# Patient Record
Sex: MALE | Race: WHITE | NOT HISPANIC OR LATINO | URBAN - METROPOLITAN AREA
[De-identification: names, ages, dates, MRNs, and addresses within clinical notes are randomized per-mention and may not be internally consistent; named-entity substitution may affect disease eponyms.]

---

## 2017-07-25 ENCOUNTER — INPATIENT (INPATIENT)
Facility: HOSPITAL | Age: 82
LOS: 6 days | Discharge: NURSING, REHAB OR LONG-TERM CARE | End: 2017-08-01
Attending: HOSPITALIST | Admitting: HOSPITALIST
Payer: MEDICARE

## 2017-07-25 VITALS
TEMPERATURE: 98 F | SYSTOLIC BLOOD PRESSURE: 150 MMHG | HEART RATE: 95 BPM | RESPIRATION RATE: 16 BRPM | DIASTOLIC BLOOD PRESSURE: 80 MMHG | OXYGEN SATURATION: 99 %

## 2017-07-25 DIAGNOSIS — E78.5 HYPERLIPIDEMIA, UNSPECIFIED: ICD-10-CM

## 2017-07-25 DIAGNOSIS — I10 ESSENTIAL (PRIMARY) HYPERTENSION: ICD-10-CM

## 2017-07-25 DIAGNOSIS — R56.9 UNSPECIFIED CONVULSIONS: ICD-10-CM

## 2017-07-25 DIAGNOSIS — I25.10 ATHEROSCLEROTIC HEART DISEASE OF NATIVE CORONARY ARTERY WITHOUT ANGINA PECTORIS: ICD-10-CM

## 2017-07-25 DIAGNOSIS — Z95.1 PRESENCE OF AORTOCORONARY BYPASS GRAFT: Chronic | ICD-10-CM

## 2017-07-25 DIAGNOSIS — S06.360A TRAUMATIC HEMORRHAGE OF CEREBRUM, UNSPECIFIED, WITHOUT LOSS OF CONSCIOUSNESS, INITIAL ENCOUNTER: ICD-10-CM

## 2017-07-25 DIAGNOSIS — I62.9 NONTRAUMATIC INTRACRANIAL HEMORRHAGE, UNSPECIFIED: ICD-10-CM

## 2017-07-25 DIAGNOSIS — I61.9 NONTRAUMATIC INTRACEREBRAL HEMORRHAGE, UNSPECIFIED: ICD-10-CM

## 2017-07-25 DIAGNOSIS — Z29.9 ENCOUNTER FOR PROPHYLACTIC MEASURES, UNSPECIFIED: ICD-10-CM

## 2017-07-25 LAB
ALBUMIN SERPL ELPH-MCNC: 3.6 G/DL — SIGNIFICANT CHANGE UP (ref 3.3–5)
ALP SERPL-CCNC: 92 U/L — SIGNIFICANT CHANGE UP (ref 40–120)
ALT FLD-CCNC: 13 U/L — SIGNIFICANT CHANGE UP (ref 4–41)
AST SERPL-CCNC: 17 U/L — SIGNIFICANT CHANGE UP (ref 4–40)
BASE EXCESS BLDV CALC-SCNC: -3.5 MMOL/L — SIGNIFICANT CHANGE UP
BASE EXCESS BLDV CALC-SCNC: -3.7 MMOL/L — SIGNIFICANT CHANGE UP
BASOPHILS # BLD AUTO: 0.02 K/UL — SIGNIFICANT CHANGE UP (ref 0–0.2)
BASOPHILS NFR BLD AUTO: 0.3 % — SIGNIFICANT CHANGE UP (ref 0–2)
BILIRUB SERPL-MCNC: 0.8 MG/DL — SIGNIFICANT CHANGE UP (ref 0.2–1.2)
BLOOD GAS VENOUS - CREATININE: 2.49 MG/DL — HIGH (ref 0.5–1.3)
BLOOD GAS VENOUS - CREATININE: 2.6 MG/DL — HIGH (ref 0.5–1.3)
BUN SERPL-MCNC: 43 MG/DL — HIGH (ref 7–23)
CALCIUM SERPL-MCNC: 8.7 MG/DL — SIGNIFICANT CHANGE UP (ref 8.4–10.5)
CHLORIDE BLDV-SCNC: 113 MMOL/L — HIGH (ref 96–108)
CHLORIDE BLDV-SCNC: 114 MMOL/L — HIGH (ref 96–108)
CHLORIDE SERPL-SCNC: 107 MMOL/L — SIGNIFICANT CHANGE UP (ref 98–107)
CO2 SERPL-SCNC: 20 MMOL/L — LOW (ref 22–31)
CREAT SERPL-MCNC: 2.55 MG/DL — HIGH (ref 0.5–1.3)
EOSINOPHIL # BLD AUTO: 0.05 K/UL — SIGNIFICANT CHANGE UP (ref 0–0.5)
EOSINOPHIL NFR BLD AUTO: 0.6 % — SIGNIFICANT CHANGE UP (ref 0–6)
GAS PNL BLDV: 140 MMOL/L — SIGNIFICANT CHANGE UP (ref 136–146)
GAS PNL BLDV: 142 MMOL/L — SIGNIFICANT CHANGE UP (ref 136–146)
GLUCOSE BLDV-MCNC: 120 — HIGH (ref 70–99)
GLUCOSE BLDV-MCNC: 138 — HIGH (ref 70–99)
GLUCOSE SERPL-MCNC: 139 MG/DL — HIGH (ref 70–99)
HCO3 BLDV-SCNC: 20 MMOL/L — SIGNIFICANT CHANGE UP (ref 20–27)
HCO3 BLDV-SCNC: 21 MMOL/L — SIGNIFICANT CHANGE UP (ref 20–27)
HCT VFR BLD CALC: 32.8 % — LOW (ref 39–50)
HCT VFR BLDV CALC: 33.1 % — LOW (ref 39–51)
HCT VFR BLDV CALC: 36.4 % — LOW (ref 39–51)
HGB BLD-MCNC: 11.7 G/DL — LOW (ref 13–17)
HGB BLDV-MCNC: 10.7 G/DL — LOW (ref 13–17)
HGB BLDV-MCNC: 11.8 G/DL — LOW (ref 13–17)
IMM GRANULOCYTES # BLD AUTO: 0.03 # — SIGNIFICANT CHANGE UP
IMM GRANULOCYTES NFR BLD AUTO: 0.4 % — SIGNIFICANT CHANGE UP (ref 0–1.5)
INR BLD: 1.15 — SIGNIFICANT CHANGE UP (ref 0.88–1.17)
LACTATE BLDV-MCNC: 0.8 MMOL/L — SIGNIFICANT CHANGE UP (ref 0.5–2)
LACTATE BLDV-MCNC: 2.1 MMOL/L — HIGH (ref 0.5–2)
LYMPHOCYTES # BLD AUTO: 0.84 K/UL — LOW (ref 1–3.3)
LYMPHOCYTES # BLD AUTO: 10.7 % — LOW (ref 13–44)
MCHC RBC-ENTMCNC: 33.4 PG — SIGNIFICANT CHANGE UP (ref 27–34)
MCHC RBC-ENTMCNC: 35.7 % — SIGNIFICANT CHANGE UP (ref 32–36)
MCV RBC AUTO: 93.7 FL — SIGNIFICANT CHANGE UP (ref 80–100)
MONOCYTES # BLD AUTO: 0.92 K/UL — HIGH (ref 0–0.9)
MONOCYTES NFR BLD AUTO: 11.7 % — SIGNIFICANT CHANGE UP (ref 2–14)
NEUTROPHILS # BLD AUTO: 6 K/UL — SIGNIFICANT CHANGE UP (ref 1.8–7.4)
NEUTROPHILS NFR BLD AUTO: 76.3 % — SIGNIFICANT CHANGE UP (ref 43–77)
NRBC # FLD: 0 — SIGNIFICANT CHANGE UP
PCO2 BLDV: 32 MMHG — LOW (ref 41–51)
PCO2 BLDV: 35 MMHG — LOW (ref 41–51)
PH BLDV: 7.39 PH — SIGNIFICANT CHANGE UP (ref 7.32–7.43)
PH BLDV: 7.42 PH — SIGNIFICANT CHANGE UP (ref 7.32–7.43)
PLATELET # BLD AUTO: 128 K/UL — LOW (ref 150–400)
PMV BLD: 9.5 FL — SIGNIFICANT CHANGE UP (ref 7–13)
PO2 BLDV: 32 MMHG — LOW (ref 35–40)
PO2 BLDV: < 24 MMHG — LOW (ref 35–40)
POTASSIUM BLDV-SCNC: 3.8 MMOL/L — SIGNIFICANT CHANGE UP (ref 3.4–4.5)
POTASSIUM BLDV-SCNC: 4.2 MMOL/L — SIGNIFICANT CHANGE UP (ref 3.4–4.5)
POTASSIUM SERPL-MCNC: 4.6 MMOL/L — SIGNIFICANT CHANGE UP (ref 3.5–5.3)
POTASSIUM SERPL-SCNC: 4.6 MMOL/L — SIGNIFICANT CHANGE UP (ref 3.5–5.3)
PROT SERPL-MCNC: 6.5 G/DL — SIGNIFICANT CHANGE UP (ref 6–8.3)
PROTHROM AB SERPL-ACNC: 12.9 SEC — SIGNIFICANT CHANGE UP (ref 9.8–13.1)
RBC # BLD: 3.5 M/UL — LOW (ref 4.2–5.8)
RBC # FLD: 13.4 % — SIGNIFICANT CHANGE UP (ref 10.3–14.5)
RH IG SCN BLD-IMP: POSITIVE — SIGNIFICANT CHANGE UP
SAO2 % BLDV: 21.9 % — LOW (ref 60–85)
SAO2 % BLDV: 49.8 % — LOW (ref 60–85)
SODIUM SERPL-SCNC: 143 MMOL/L — SIGNIFICANT CHANGE UP (ref 135–145)
WBC # BLD: 7.86 K/UL — SIGNIFICANT CHANGE UP (ref 3.8–10.5)
WBC # FLD AUTO: 7.86 K/UL — SIGNIFICANT CHANGE UP (ref 3.8–10.5)

## 2017-07-25 PROCEDURE — 70450 CT HEAD/BRAIN W/O DYE: CPT | Mod: 26

## 2017-07-25 PROCEDURE — 99291 CRITICAL CARE FIRST HOUR: CPT

## 2017-07-25 RX ORDER — ATORVASTATIN CALCIUM 80 MG/1
1 TABLET, FILM COATED ORAL
Qty: 0 | Refills: 0 | COMMUNITY

## 2017-07-25 RX ORDER — LEVETIRACETAM 250 MG/1
1000 TABLET, FILM COATED ORAL ONCE
Qty: 0 | Refills: 0 | Status: COMPLETED | OUTPATIENT
Start: 2017-07-25 | End: 2017-07-25

## 2017-07-25 RX ORDER — OXYBUTYNIN CHLORIDE 5 MG
2.5 TABLET ORAL
Qty: 0 | Refills: 0 | COMMUNITY

## 2017-07-25 RX ORDER — LABETALOL HCL 100 MG
10 TABLET ORAL ONCE
Qty: 0 | Refills: 0 | Status: DISCONTINUED | OUTPATIENT
Start: 2017-07-25 | End: 2017-07-25

## 2017-07-25 RX ORDER — DESMOPRESSIN ACETATE 0.1 MG/1
29 TABLET ORAL ONCE
Qty: 0 | Refills: 0 | Status: COMPLETED | OUTPATIENT
Start: 2017-07-25 | End: 2017-07-25

## 2017-07-25 RX ORDER — PANTOPRAZOLE SODIUM 20 MG/1
1 TABLET, DELAYED RELEASE ORAL
Qty: 0 | Refills: 0 | COMMUNITY

## 2017-07-25 RX ADMIN — LEVETIRACETAM 400 MILLIGRAM(S): 250 TABLET, FILM COATED ORAL at 16:27

## 2017-07-25 RX ADMIN — DESMOPRESSIN ACETATE 229 MICROGRAM(S): 0.1 TABLET ORAL at 16:45

## 2017-07-25 RX ADMIN — LEVETIRACETAM 400 MILLIGRAM(S): 250 TABLET, FILM COATED ORAL at 16:09

## 2017-07-25 RX ADMIN — Medication 1 MILLIGRAM(S): at 16:05

## 2017-07-25 NOTE — ED ADULT NURSE NOTE - OBJECTIVE STATEMENT
Facilitator note: Pt received to room 4 as a stroke code pt noted with left side weakness he is able to follow commands and answer questions pt during sandor evaluation note to gaze off and have left arm tremor neruo resident witness partial seizure he was medicated as ordered. Pt was placed on a cardiac monitor an iv was accessed labs sent. See systems for assessment.

## 2017-07-25 NOTE — H&P ADULT - PROBLEM SELECTOR PLAN 6
SCDs, chemical prophylaxis contraindicated due to ICH Will restart atorvastatin 40 mg once taking po

## 2017-07-25 NOTE — ED PROVIDER NOTE - MEDICAL DECISION MAKING DETAILS
Witnessed seizure activity in ED. Frontal bleed on CT. PT/INR, CMP, CBC, type and cross, platelets ordered. Seen by neurology and neurosurgery. Will admit to ICU. Witnessed seizure activity in ED. Frontal bleed on CT. PT/INR, CMP, CBC, type and cross, platelets ordered. Seen by neurology and neurosurgery. Seizure ppx, blood pressure control goal < 160. Will admit to ICU.

## 2017-07-25 NOTE — ED PROVIDER NOTE - PHYSICAL EXAMINATION
General: well appearing male in mild distress    HEENT: normocephalic, atraumatic   Respiratory: lungs clear to auscultation bilaterally   Cardiac: regular rate and rhythm, vertical scar on chest   Abdomen: soft, non-tender  MSK:   Neuro: cranial nerves II-XII intact, slight left sided naso-labial flattening, left sided weakness in upper and lower extremities, no sensory deficits General: well appearing male in mild distress    HEENT: normocephalic, atraumatic   Respiratory: lungs clear to auscultation bilaterally   Cardiac: regular rate and rhythm, vertical scar on chest   Abdomen: soft, non-tender  MSK: abrasion on right arm  Neuro: A&Ox3, cranial nerves II-XII intact, slight left sided naso-labial flattening, left sided weakness in upper and lower extremities, observed partial left-sided seizure activity, no sensory deficits

## 2017-07-25 NOTE — CONSULT NOTE ADULT - SUBJECTIVE AND OBJECTIVE BOX
Neurology Consult    Name  LAYTON BEAULIEU    89 year old gentleman who presents from doctor's office where he had a seizure of his left upper extremity. This was followed by inability to move his his left arm since the event. Patient was visiting his neurologist at the time reviewing the results of an MRI and MRA.   Currently patient is still having weakness on his left side and is also complaining of a headache.  Code stroke was called and it was discovered that he had a right frontal hemorrhage and deemed not a tPA candidate. He then went on to develop another focal seizure of the left side of his body which lasted less than 30 seconds without post-ictal period.   MRS 1                                                          MEDICATIONS  (STANDING):  levETIRAcetam  IVPB 1000 milliGRAM(s) IV Intermittent once  desmopressin IVPB 29 MICROGram(s) IV Intermittent Once  levETIRAcetam  IVPB 1000 milliGRAM(s) IV Intermittent once  LORazepam   Injectable 1 milliGRAM(s) IV Push Once    MEDICATIONS  (PRN):      Allergies    No Known Allergies    Intolerances        Objective  Vital Signs Last 24 Hrs  T(C): 36.7 (25 Jul 2017 15:25), Max: 36.7 (25 Jul 2017 15:25)  T(F): 98 (25 Jul 2017 15:25), Max: 98 (25 Jul 2017 15:25)  HR: 95 (25 Jul 2017 15:25) (95 - 95)  BP: 150/80 (25 Jul 2017 15:25) (150/80 - 150/80)  BP(mean): --  RR: 16 (25 Jul 2017 15:25) (16 - 16)  SpO2: 99% (25 Jul 2017 15:25) (99% - 99%)    General Exam   General appearance: No acute distress, well-nourished  Respiratory:    non-labored respirations               Neurological Exam  Mental Status:  alert and oriented x3, fluent speech, following commands    Cranial Nerves: left pupil 2 mm, right pupil 2.5mm, EOMI without nystagmus, visual fields intact, left facial droop, no dysarthria    Motor:   Tone:   normal               Strength:  Upper extremity                          Delt       Bicep    Tricep                                                  R         5/5        5/5        5/5       5/5                                               L          4/5        3/5        3/5      2/5    Lower extremity: both lower extremities drift                          Pronator drift:   none           Dysmetria: none with finger-to-nose testing  Tremor:  none appreciated at rest or in action    Sensation: intact grossly to light touch    Deep Tendon Reflexes:   Toes flexor bilaterally ________    Gait:     Other Studies                    Radiology    CTh/CTA:  MRI/MRA:  TTE:  EEG: Neurology Consult    Name  LAYTON BEAULIEU    89 year old gentleman who presents from doctor's office where he had a seizure of his left upper extremity. This was followed by inability to move his his left arm since the event. Patient was visiting his neurologist at the time reviewing the results of an MRI and MRA. Reported feeling dizzy and lightheaded earlier that day.  Currently patient is still having weakness on his left side and is also complaining of a headache.  Code stroke was called and it was discovered that he had a right frontal hemorrhage and deemed not a tPA candidate. He then went on to develop another focal seizure of the left side of his body which lasted less than 30 seconds without post-ictal period.   MRS 1                                                          MEDICATIONS  (STANDING):  levETIRAcetam  IVPB 1000 milliGRAM(s) IV Intermittent once  desmopressin IVPB 29 MICROGram(s) IV Intermittent Once  levETIRAcetam  IVPB 1000 milliGRAM(s) IV Intermittent once  LORazepam   Injectable 1 milliGRAM(s) IV Push Once    MEDICATIONS  (PRN):      Allergies    No Known Allergies    Intolerances        Objective  Vital Signs Last 24 Hrs  T(C): 36.7 (25 Jul 2017 15:25), Max: 36.7 (25 Jul 2017 15:25)  T(F): 98 (25 Jul 2017 15:25), Max: 98 (25 Jul 2017 15:25)  HR: 95 (25 Jul 2017 15:25) (95 - 95)  BP: 150/80 (25 Jul 2017 15:25) (150/80 - 150/80)  BP(mean): --  RR: 16 (25 Jul 2017 15:25) (16 - 16)  SpO2: 99% (25 Jul 2017 15:25) (99% - 99%)    General Exam   General appearance: No acute distress, well-nourished  Respiratory:    non-labored respirations               Neurological Exam  Mental Status:  alert and oriented x3, fluent speech, following commands    Cranial Nerves: left pupil 2 mm, right pupil 2.5mm, EOMI without nystagmus, visual fields intact, left facial droop, no dysarthria    Motor:   Tone:   normal               Strength:  Upper extremity                          Delt       Bicep    Tricep                                                  R         5/5        5/5        5/5       5/5                                               L          4/5        3/5        3/5      2/5    Lower extremity: both lower extremities drift                          Pronator drift:   left UE, LE       Dysmetria: none with finger-to-nose testing  Tremor:  none appreciated at rest or in action            Radiology    CTh: Acute right superior frontal gyrus hematoma, with associated vasogenic  edema. No midline shift. No hydrocephalus. While this may be due to  hemorrhagic infarction or hypertensive hemorrhage, neoplasm or vascular  malformation cannot be excluded. Recommend contrast brain MRI and or head  CTA for further evaluation.    Subarachnoid and interhemispheric/right tentorial subdural hemorrhage is  also seen.

## 2017-07-25 NOTE — ED PROVIDER NOTE - PROGRESS NOTE DETAILS
AJM STROKE CODE NOTE: Pt presenting from neurologists office with left sided weakness. Around 240 he had twitching in left arm which was noted as a possible "partial complex seizure" then weakness in left arm and leg. ams for several days after a fall. Here patient has slightly decreased strength in left arm and left leg. CN 2-12 intact. follows commands well. stroke code called. signed out to dr barrera. ct head ordered and neuro aware neurosurgery consulted to see pt in ED. Kevin Quiroz MD PGY3

## 2017-07-25 NOTE — ED ADULT NURSE REASSESSMENT NOTE - NS ED NURSE REASSESS COMMENT FT1
Patient changed, skin intact, areas of blanchable redness to buttocks/sacral area. Diaper rash noted to ileac folds.

## 2017-07-25 NOTE — ED ADULT NURSE REASSESSMENT NOTE - NS ED NURSE REASSESS COMMENT FT1
1710 Pt is resting well no seizure activity noted v/s taken patient is afebrile cm NSR Pt is receiving his first unit of Platelets via an alaris pump and #20 gauge in the right AC .will continue to monitor.

## 2017-07-25 NOTE — H&P ADULT - PROBLEM SELECTOR PLAN 4
Holding metoprolol, will check TTE in AM Currently at goal BP of 140/90  - Will start cardene drip if becomes hypertensive  - Holding metoprolol

## 2017-07-25 NOTE — H&P ADULT - NSHPPHYSICALEXAM_GEN_ALL_CORE
General: awake, not in distress  Neuro: AAOx1, CN II-XII intact, rare one word communication, following commands, increased tone in all extremities, pronator drift, LE strength 2/5 b/l, upper extremity strength 4/5 b/l, resting tremor, pronator drift.   HEENT: EOMI  Neck: no JVD or carotid bruit  Lymph: no cervical lymphadenopathy  Lungs: CTA b/l  Heart: RRR, no murmurs  Abdomen: soft, NTND, +BS  : madrigal in place  Extremities: no pedal edema  Psych: could not assess

## 2017-07-25 NOTE — H&P ADULT - ASSESSMENT
89M with HTN and CAD s/p CABG, recent ICU admission at [place] reportedly for respiratory failure requiring intubation and epistaxis requiring multiple transfusions presents with fall and subsequent seizures, code stroke in ED, found to have acute R frontal intraparenchymal hemorrhage. 89M with HTN and CAD s/p CABG presents with fall and subsequent seizures, code stroke in ED, found to have acute R frontal intraparenchymal hemorrhage.

## 2017-07-25 NOTE — ED ADULT NURSE REASSESSMENT NOTE - NS ED NURSE REASSESS COMMENT FT1
Report taken from daytime RN Judie- per daytime RN's report, pt was A&Ox3, however at this time, pt is A&Ox1 to person. L sided weakness noted with drift with minimal response to commands. MICU attending and resident are at bedside for consult- pt will be going to MICU pending bed assignment. Pt placed on supplemental O2 via NC, respirations even and unlabored. Will monitor patient closely.

## 2017-07-25 NOTE — ED ADULT TRIAGE NOTE - CHIEF COMPLAINT QUOTE
had focal seizure in neurologist office post seizure pt was unable to move left side since 1444, also AMS x few days and fall yesterday

## 2017-07-25 NOTE — H&P ADULT - PROBLEM SELECTOR PLAN 3
Currently at goal BP of 140/90  - Will start cardene drip if becomes hypertensive  - Holding metoprolol Unclear baseline - monitor creatinine, send urine electrolytes

## 2017-07-25 NOTE — ED ADULT NURSE REASSESSMENT NOTE - NS ED NURSE REASSESS COMMENT FT1
1745 first unit of platelets completed without any adverse reactions pt remains afebrile respirations are even unlabored cm NSR.

## 2017-07-25 NOTE — H&P ADULT - HISTORY OF PRESENT ILLNESS
89M with HTN and CAD s/p CABG, recent month-long ICU admission at [place] reportedly for respiratory failure requiring intubation x 1 month and epistaxis requiring 9u blood transfusions presents following fall at home and seizure at his neurologist's office. Per ED documentation, on arrival in the ED pt complained of headache and left sided weakness since his seizure but was conversant and AAOx3 at the time. Code stroke was called and pt was deemed to not be a TPA candidate. CT head revealed acute R superior frontal gyrus subarachnoid hematoma without midline shift along with subarachnoid and interhemispheric/right tentorial subdural hemorrhage. Pt was evaluated by neurology neurosurgery who recommended repeat CT head in the AM.     Pt is Currently lethargic and AAOx1, called wife for further history. Pt reportedly got out of bed to early this AM to bring her a glass of water but fell on the carpeted floor of her bedroom and was unable to get up. Pt's wife called EMS who offered to bring pt to the hospital but pt and wife refused, preferring instead to wait for his neurology appointment at 2pm. Pt's wife was somehow able to bring him to his neurology appointment, where he had the left-sided focal seizure and was sent to the ED.    Pt currently has normal vital signs. Labs revealed mild anemia and Cr 2.55. Pt was given 2g keppra, DDAVP, and platelets. 89M with HTN and CAD s/p CABG, recent month-long ICU admission at [facility], presents following fall at home and seizure at his neurologist's office. Per ED documentation, on arrival in the ED pt complained of headache and left sided weakness since his seizure but was conversant and AAOx3 at the time. Code stroke was called and pt was deemed to not be a TPA candidate. CT head revealed acute R superior frontal gyrus subarachnoid hematoma without midline shift along with subarachnoid and interhemispheric/right tentorial subdural hemorrhage. Pt was evaluated by neurology neurosurgery who recommended repeat CT head in the AM.     Pt is Currently lethargic and AAOx1, called wife for further history. Pt reportedly got out of bed to early this AM to bring her a glass of water but fell on the carpeted floor of her bedroom and was unable to get up. Pt's wife called EMS who offered to bring pt to the hospital but pt and wife refused, preferring instead to wait for his neurology appointment at 2pm. Pt's wife was somehow able to bring him to his neurology appointment, where he had the left-sided focal seizure and was sent to the ED.    Pt currently has normal vital signs. Labs revealed mild anemia and Cr 2.55. Pt was given 2g keppra, DDAVP, and platelets.

## 2017-07-25 NOTE — CONSULT NOTE ADULT - ASSESSMENT
89 year old male with history of HTN, BPH, CAD presents with sudden-onset of left focal UE seizure and left sided facial droop and weakness found to have a R frontal IPH. Volume 8.5 ml. IC h score: 1. Patient currently neurologically stable.

## 2017-07-25 NOTE — H&P ADULT - PROBLEM SELECTOR PLAN 1
- q2h neuro checks  - Repeat CT head in AM or prn for worsening mental status  - Follow up neurology and neurosurgery in AM  - eventual MRI brain and TTE  - HgbA1c, lipid panel  - NPO

## 2017-07-25 NOTE — CONSULT NOTE ADULT - SUBJECTIVE AND OBJECTIVE BOX
CHRISTOFERLAYTON BAILEY 89y ,Male  HPI:  89 year old gentleman who presents from doctor's office where he had a seizure of his left upper extremity. This was followed by inability to move his his left arm since the event. Patient was visiting his neurologist at the time reviewing the results of an MRI and MRA. Reported feeling dizzy and lightheaded earlier that day.Currently patient is still having weakness on his left side and is also complaining of a headache.  Code stroke was called and it was discovered that he had a right frontal hemorrhage and deemed not a tPA candidate. He then went on to develop another focal seizure of the left side of his body which lasted less than 30 seconds without post-ictal period. Upon Physical exam patient reports weakness on his left side, denies any other complaints.   in ER Patient recieved keppra and DDAVP b/c of h/o ASA use.      PAST MEDICAL & SURGICAL HISTORY:  HTN, BPH, CAD        No Known Allergies    MEDICATIONS  (STANDING):    MEDICATIONS  (PRN):    Vital Signs Last 24 Hrs  T(C): 36.7 (25 Jul 2017 15:25), Max: 36.7 (25 Jul 2017 15:25)  T(F): 98 (25 Jul 2017 15:25), Max: 98 (25 Jul 2017 15:25)  HR: 89 (25 Jul 2017 16:10) (89 - 95)  BP: 137/77 (25 Jul 2017 16:10) (137/77 - 150/80)  BP(mean): --  RR: 16 (25 Jul 2017 16:10) (16 - 16)  SpO2: 100% (25 Jul 2017 16:10) (99% - 100%)    PE:  AA&0 x 3, PERRL, speach clear, follows commands, EOMI, mild left facial droop  NC/AT, no cephalohematomas  Motor: RUE/RLE 5/5 , LUE proximal 4/5,  2/5,  LLE: 2/5  Sensory: intact to light touch    LABS:                        11.7   7.86  )-----------( 128      ( 25 Jul 2017 15:49 )             32.8     07-25    143  |  107  |  43<H>  ----------------------------<  139<H>  4.6   |  20<L>  |  2.55<H>    Ca    8.7      25 Jul 2017 15:49    TPro  6.5  /  Alb  3.6  /  TBili  0.8  /  DBili  x   /  AST  17  /  ALT  13  /  AlkPhos  92  07-25    PT/INR - ( 25 Jul 2017 15:49 )   PT: 12.9 SEC;   INR: 1.15          PTT - ( 25 Jul 2017 15:49 )  PTT:29.7 SEC

## 2017-07-25 NOTE — H&P ADULT - ATTENDING COMMENTS
patient with hx of cad, presents with weakness and lethargy. Noted tonic clonic seizure x2 in the er  and noted to have a left frontal bleed.   He was previously on asa and got ddavp. \  PE lugns clear, heart regular, abdomen benign. neuro moves all ext.   critically ill needs neuro checks

## 2017-07-25 NOTE — H&P ADULT - PMH
CAD (coronary artery disease)    HTN (hypertension)    Hyperlipidemia, unspecified hyperlipidemia type

## 2017-07-25 NOTE — ED PROVIDER NOTE - OBJECTIVE STATEMENT
Patient reports he has had dizziness for the past week described as feeling lightheaded. This morning he fell while trying to get back into bed. Unknown LOC. Believes he tripped over his shoes. Denies any chest pain or shortness of breath. Was sent to the ED by the neurologist because of concern for seizure. Patient reports he has had dizziness for the past week described as feeling lightheaded. This morning he fell while trying to get back into bed. Unknown LOC. Believes he tripped over his shoes. Denies any chest pain or shortness of breath. Was sent to the ED by the neurologist because of concern for seizure. Stroke alert called for left sided weakness. CT showed frontal bleed.

## 2017-07-26 ENCOUNTER — TRANSCRIPTION ENCOUNTER (OUTPATIENT)
Age: 82
End: 2017-07-26

## 2017-07-26 DIAGNOSIS — N17.9 ACUTE KIDNEY FAILURE, UNSPECIFIED: ICD-10-CM

## 2017-07-26 LAB
ALBUMIN SERPL ELPH-MCNC: 3.6 G/DL — SIGNIFICANT CHANGE UP (ref 3.3–5)
ALP SERPL-CCNC: 88 U/L — SIGNIFICANT CHANGE UP (ref 40–120)
ALT FLD-CCNC: 14 U/L — SIGNIFICANT CHANGE UP (ref 4–41)
APTT BLD: 29.3 SEC — SIGNIFICANT CHANGE UP (ref 27.5–37.4)
AST SERPL-CCNC: 20 U/L — SIGNIFICANT CHANGE UP (ref 4–40)
BASOPHILS # BLD AUTO: 0.02 K/UL — SIGNIFICANT CHANGE UP (ref 0–0.2)
BASOPHILS NFR BLD AUTO: 0.3 % — SIGNIFICANT CHANGE UP (ref 0–2)
BILIRUB SERPL-MCNC: 0.8 MG/DL — SIGNIFICANT CHANGE UP (ref 0.2–1.2)
BUN SERPL-MCNC: 37 MG/DL — HIGH (ref 7–23)
CALCIUM SERPL-MCNC: 8.5 MG/DL — SIGNIFICANT CHANGE UP (ref 8.4–10.5)
CHLORIDE SERPL-SCNC: 109 MMOL/L — HIGH (ref 98–107)
CO2 SERPL-SCNC: 19 MMOL/L — LOW (ref 22–31)
CREAT ?TM UR-MCNC: 83.62 MG/DL — SIGNIFICANT CHANGE UP
CREAT SERPL-MCNC: 2.33 MG/DL — HIGH (ref 0.5–1.3)
EOSINOPHIL # BLD AUTO: 0.09 K/UL — SIGNIFICANT CHANGE UP (ref 0–0.5)
EOSINOPHIL NFR BLD AUTO: 1.5 % — SIGNIFICANT CHANGE UP (ref 0–6)
GLUCOSE SERPL-MCNC: 115 MG/DL — HIGH (ref 70–99)
HCT VFR BLD CALC: 31.4 % — LOW (ref 39–50)
HGB BLD-MCNC: 11.1 G/DL — LOW (ref 13–17)
IMM GRANULOCYTES # BLD AUTO: 0.03 # — SIGNIFICANT CHANGE UP
IMM GRANULOCYTES NFR BLD AUTO: 0.5 % — SIGNIFICANT CHANGE UP (ref 0–1.5)
INR BLD: 1.1 — SIGNIFICANT CHANGE UP (ref 0.88–1.17)
LYMPHOCYTES # BLD AUTO: 1.11 K/UL — SIGNIFICANT CHANGE UP (ref 1–3.3)
LYMPHOCYTES # BLD AUTO: 18.5 % — SIGNIFICANT CHANGE UP (ref 13–44)
MAGNESIUM SERPL-MCNC: 1.4 MG/DL — LOW (ref 1.6–2.6)
MCHC RBC-ENTMCNC: 34.2 PG — HIGH (ref 27–34)
MCHC RBC-ENTMCNC: 35.4 % — SIGNIFICANT CHANGE UP (ref 32–36)
MCV RBC AUTO: 96.6 FL — SIGNIFICANT CHANGE UP (ref 80–100)
MONOCYTES # BLD AUTO: 0.69 K/UL — SIGNIFICANT CHANGE UP (ref 0–0.9)
MONOCYTES NFR BLD AUTO: 11.5 % — SIGNIFICANT CHANGE UP (ref 2–14)
NEUTROPHILS # BLD AUTO: 4.05 K/UL — SIGNIFICANT CHANGE UP (ref 1.8–7.4)
NEUTROPHILS NFR BLD AUTO: 67.7 % — SIGNIFICANT CHANGE UP (ref 43–77)
NRBC # FLD: 0 — SIGNIFICANT CHANGE UP
PHOSPHATE SERPL-MCNC: 2.8 MG/DL — SIGNIFICANT CHANGE UP (ref 2.5–4.5)
PLATELET # BLD AUTO: 139 K/UL — LOW (ref 150–400)
PMV BLD: 9.8 FL — SIGNIFICANT CHANGE UP (ref 7–13)
POTASSIUM SERPL-MCNC: 4.6 MMOL/L — SIGNIFICANT CHANGE UP (ref 3.5–5.3)
POTASSIUM SERPL-SCNC: 4.6 MMOL/L — SIGNIFICANT CHANGE UP (ref 3.5–5.3)
PROT SERPL-MCNC: 6.5 G/DL — SIGNIFICANT CHANGE UP (ref 6–8.3)
PROTHROM AB SERPL-ACNC: 12.4 SEC — SIGNIFICANT CHANGE UP (ref 9.8–13.1)
RBC # BLD: 3.25 M/UL — LOW (ref 4.2–5.8)
RBC # FLD: 13.3 % — SIGNIFICANT CHANGE UP (ref 10.3–14.5)
SODIUM SERPL-SCNC: 144 MMOL/L — SIGNIFICANT CHANGE UP (ref 135–145)
SODIUM UR-SCNC: 83 MEQ/L — SIGNIFICANT CHANGE UP
UUN UR-MCNC: 654.1 MG/DL — SIGNIFICANT CHANGE UP
WBC # BLD: 5.99 K/UL — SIGNIFICANT CHANGE UP (ref 3.8–10.5)
WBC # FLD AUTO: 5.99 K/UL — SIGNIFICANT CHANGE UP (ref 3.8–10.5)

## 2017-07-26 PROCEDURE — 70450 CT HEAD/BRAIN W/O DYE: CPT | Mod: 26

## 2017-07-26 PROCEDURE — 99233 SBSQ HOSP IP/OBS HIGH 50: CPT

## 2017-07-26 PROCEDURE — 99223 1ST HOSP IP/OBS HIGH 75: CPT

## 2017-07-26 RX ORDER — MAGNESIUM SULFATE 500 MG/ML
2 VIAL (ML) INJECTION ONCE
Qty: 0 | Refills: 0 | Status: COMPLETED | OUTPATIENT
Start: 2017-07-26 | End: 2017-07-26

## 2017-07-26 RX ORDER — SODIUM CHLORIDE 9 MG/ML
1000 INJECTION, SOLUTION INTRAVENOUS
Qty: 0 | Refills: 0 | Status: DISCONTINUED | OUTPATIENT
Start: 2017-07-26 | End: 2017-07-26

## 2017-07-26 RX ORDER — VANCOMYCIN HCL 1 G
1000 VIAL (EA) INTRAVENOUS EVERY 12 HOURS
Qty: 0 | Refills: 0 | Status: DISCONTINUED | OUTPATIENT
Start: 2017-07-26 | End: 2017-07-26

## 2017-07-26 RX ORDER — SODIUM CHLORIDE 9 MG/ML
1000 INJECTION INTRAMUSCULAR; INTRAVENOUS; SUBCUTANEOUS
Qty: 0 | Refills: 0 | Status: DISCONTINUED | OUTPATIENT
Start: 2017-07-26 | End: 2017-07-26

## 2017-07-26 RX ORDER — LEVETIRACETAM 250 MG/1
1000 TABLET, FILM COATED ORAL EVERY 12 HOURS
Qty: 0 | Refills: 0 | Status: DISCONTINUED | OUTPATIENT
Start: 2017-07-26 | End: 2017-07-28

## 2017-07-26 RX ADMIN — LEVETIRACETAM 400 MILLIGRAM(S): 250 TABLET, FILM COATED ORAL at 06:48

## 2017-07-26 RX ADMIN — SODIUM CHLORIDE 75 MILLILITER(S): 9 INJECTION, SOLUTION INTRAVENOUS at 08:31

## 2017-07-26 RX ADMIN — LEVETIRACETAM 400 MILLIGRAM(S): 250 TABLET, FILM COATED ORAL at 17:16

## 2017-07-26 RX ADMIN — Medication 50 GRAM(S): at 04:35

## 2017-07-26 NOTE — DISCHARGE NOTE ADULT - NS AS DC STROKE ED MATERIALS
Risk Factors for Stroke/Stroke Warning Signs and Symptoms/Call 911 for Stroke/Need for Followup After Discharge/Stroke Education Booklet/Prescribed Medications

## 2017-07-26 NOTE — CHART NOTE - NSCHARTNOTEFT_GEN_A_CORE
89M with HTN and CAD s/p CABG who presented s/p fall at home and seizure at his neurologist's office. Per ED documentation, on arrival in the ED pt complained of headache and left sided weakness since his seizure but was conversant and AAOx3 at the time. Code stroke was called and pt was deemed to not be a TPA candidate. CT head revealed acute R superior frontal gyrus subarachnoid hematoma without midline shift along with subarachnoid and interhemispheric/right tentorial subdural hemorrhage. Pt was evaluated by neurology neurosurgery who recommended repeat CT at 3PM.     #Acute right superior frontal gyrus hematoma  - Neuro surg and neuro on board.   - CT head ordered for 3:30 PM today  - MRI of the head ordered to further eval bleed.   - Keppra for seizures.   - BP currently stable will keep BP <140/80    #GORGE v CKD  - FeNa > 1%  - FeUrea >35%  - likely CKD  - Monitor Cr.     #CAD  - Holding antiplatelet agents.

## 2017-07-26 NOTE — DISCHARGE NOTE ADULT - PATIENT PORTAL LINK FT
“You can access the [facility] Patient Portal, offered by [facility], by registering with the following website: [email or web address]”

## 2017-07-26 NOTE — DISCHARGE NOTE ADULT - COMMUNITY RESOURCES
[facility]. [address] [id number] [phone]. . [facility] [phone]

## 2017-07-26 NOTE — PROGRESS NOTE ADULT - SUBJECTIVE AND OBJECTIVE BOX
CHIEF COMPLAINT: Patient is a 89y old  Male who presents with a chief complaint of Seizure, Subarachnoid Hemorrhage (25 Jul 2017 22:39)    Interval Events: More alert this AM but still confused and AAOx1. Following commands. Stable neuro checks overnight, continued mild L sided weakness.     REVIEW OF SYSTEMS:  Constitutional:   Eyes: no vision changes  ENT:  CV:  Resp:  GI:  :  MSK:  Integumentary:  Neurological: weakness L >R but no headache, nausea, numbness  Psychiatric:  Endocrine:  Hematologic/Lymphatic:  Allergic/Immunologic:  [ x ] All other systems negative  [ ] Unable to assess ROS because ________    OBJECTIVE:  ICU Vital Signs Last 24 Hrs  T(C): 36.9 (26 Jul 2017 00:00), Max: 36.9 (25 Jul 2017 17:15)  T(F): 98.4 (26 Jul 2017 00:00), Max: 98.4 (25 Jul 2017 17:15)  HR: 78 (26 Jul 2017 05:00) (77 - 96)  BP: 137/66 (26 Jul 2017 05:00) (108/45 - 156/76)  BP(mean): 82 (26 Jul 2017 05:00) (60 - 96)  ABP: --  ABP(mean): --  RR: 12 (26 Jul 2017 05:00) (12 - 29)  SpO2: 99% (26 Jul 2017 05:00) (96% - 100%)        07-25 @ 07:01  -  07-26 @ 06:08  --------------------------------------------------------  IN: 50 mL / OUT: 50 mL / NET: 0 mL      CAPILLARY BLOOD GLUCOSE  144 (25 Jul 2017 15:25)          PHYSICAL EXAM:  General: appears comfortable  HEENT: PERRL, EOMI  Lymph Nodes: no cervical lymphadenopathy  Neck: no carotid bruit  Respiratory: CTA b/l  Cardiovascular: RRR, no murmurs  Abdomen: +BS, NTND  Extremities: no pedal edema  Skin: no rashes  Neurological: AAOx1, CN II-XII intact, no facial droop, strength 4/5 LLE otherwise 5/5 in other extremities, tremor at rest, pronator drift  Psychiatry: conversant but confused    HOSPITAL MEDICATIONS:  MEDICATIONS  (STANDING): none    MEDICATIONS  (PRN): none      LABS:  (07-26 @ 01:40)                        11.1  5.99 )-----------( 139                 31.4    Neutrophils = 4.05 (67.7%)  Lymphocytes = 1.11 (18.5%)  Eosinophils = 0.09 (1.5%)  Basophils = 0.02 (0.3%)  Monocytes = 0.69 (11.5%)  Bands = --%    WBC Trend: 5.99<--, 7.86<--  Hb Trend: 11.1<--, 11.7<--  Plt Trend: 139<--, 128<--  07-26    144  |  109<H>  |  37<H>  ----------------------------<  115<H>  4.6   |  19<L>  |  2.33<H>    Ca    8.5      26 Jul 2017 01:40  Phos  2.8     07-26  Mg     1.4     07-26    TPro  6.5  /  Alb  3.6  /  TBili  0.8  /  DBili  x   /  AST  20  /  ALT  14  /  AlkPhos  88  07-26    Creatinine Trend: 2.33<--, 2.55<--  PT/INR - ( 26 Jul 2017 01:40 )   PT: 12.4 SEC;   INR: 1.10          PTT - ( 26 Jul 2017 01:40 )  PTT:29.3 SEC      Venous Blood Gas:  07-25 @ 20:34  7.42/32/32/21/49.8  VBG Lactate: 0.8  Venous Blood Gas:  07-25 @ 15:49  7.39/35/< 24/20/21.9  VBG Lactate: 2.1          MICROBIOLOGY:   Blood Cx:  Urine Cx:  Sputum Cx:  Legionella:  RVP:    RADIOLOGY:  X Ray:  CT:  MRI:  Ultrasound:  [ ] Reviewed and interpreted by me    EKG: normal sinus rhythm

## 2017-07-26 NOTE — PROGRESS NOTE ADULT - ASSESSMENT
89M with HTN and CAD s/p CABG, recent ICU admission at [place] reportedly for respiratory failure requiring intubation and epistaxis requiring multiple transfusions presents with fall and subsequent seizures, code stroke in ED, found to have acute R frontal intraparenchymal hemorrhage.     Neuro:   - R frontal subarachnoid hemorrhage: stable neuro checks continue q2h, repeat CT head, follow up neurology and neurosurgery, eventual MRI brain and TTE, restart pt's home high dose statin once eating as currently NPO  - Seizures: likely due to SAH, keppra loaded with 2g in ED, continue 1000 mg q12h   Psych: Remains confused and AAOx1, reportedly AAOx3 at baseline and fully functional  CV: BP at goal, cardene drip per neuro if hypertensive above goal /90, holding lasix and metoprolol  Pulmonary: No issues, CTA b/l  Renal: Likely GORGE though baseline Cr unknown, will check FEUrea as takes lasix at home  GI: No issues  Endo: no issues  Heme: thrombocytopenia and mild anemia

## 2017-07-26 NOTE — DISCHARGE NOTE ADULT - CARE PROVIDER_API CALL
PCP at Rehab,   Phone: (   )    -  Fax: (   )    -    Rafita Segura (DOYLE), Neurology; Vascular Neurology  [address] [place] [id number]  Phone: [phone]  Fax: [phone]

## 2017-07-26 NOTE — DISCHARGE NOTE ADULT - PLAN OF CARE
prevent recurrent CVA Take medications as instructed on discharge  Follow up with PCP at rehab.  Follow up with Dr. Segura (Neurologist) in 2-4 weeks. Keep BP Less than 140/90 Take medications as instructed on discharge  Follow up with PCP at rehab. Continue full regimen of Antibiotic Amoxicillin and Follow up with PCP at rehab. Continue full regimen of Antibiotic Amoxicillin (Total of 10 days) and Follow up with PCP at rehab. Last day on 8/8/17. Continue Keppra to prevent seizures. Prevent recurrence. Take medications as instructed on discharge. Follow up with PCP at rehab. Follow up with vascular neurologist, Dr. Segura or Dr. Sargent in 2-4 weeks. You will need repeat MRI and EEG in 3-6 months. Continue Norvasc as directed to maintain goal blood pressure 140/90mmhg. Low salt diet. Take medications as instructed on discharge. Follow up with your primary care physician for further monitoring in 1-2 weeks. Please call to arrange appointment. Follow up with your primary care physician for further monitoring in 1-2 weeks. Please call to arrange appointment. Follow up with Dr. Segura (Neurologist) in 2-4 weeks. Please call for an appointment Complete course of antibiotic Amoxicillin (Total of 10 days) and follow up with your doctor at rehab. Last day on 8/8/17. Your antiplatelet medications were held due to bleed. Follow up with your primary care physician/cardiologist for further monitoring in 1-2 weeks. Please call to arrange appointment. Complete course of antibiotic, Amoxicillin (Total of 10 days) and follow up with your doctor at rehab. Last day on 8/8/17.

## 2017-07-26 NOTE — DISCHARGE NOTE ADULT - MEDICATION SUMMARY - MEDICATIONS TO TAKE
I will START or STAY ON the medications listed below when I get home from the hospital:    gabapentin 600 mg oral tablet  -- 1 tab(s) by mouth 3 times a day  -- Indication: For Neuropathy    levETIRAcetam 750 mg oral tablet  -- 1 tab(s) by mouth 2 times a day  -- Indication: For Seizure    atorvastatin 40 mg oral tablet  -- 1 tab(s) by mouth once a day  -- Indication: For Hyperlipidemia, unspecified hyperlipidemia type    metoprolol tartrate 50 mg oral tablet  -- 1 tab(s) by mouth 2 times a day  -- Indication: For HTN (hypertension)    amLODIPine 10 mg oral tablet  -- 1 tab(s) by mouth once a day  -- Indication: For HTN (hypertension)    furosemide 20 mg oral tablet  -- 1 tab(s) by mouth once a day  -- Indication: For HTN (hypertension)    melatonin 3 mg oral tablet  -- 1 tab(s) by mouth once a day (at bedtime), As needed, Insomnia  -- Indication: For Insomnia    amoxicillin 500 mg oral capsule  -- 1 cap(s) by mouth every 12 hours  -- Indication: For UTI (urinary tract infection)    pantoprazole 40 mg oral delayed release tablet  -- 1 tab(s) by mouth once a day  -- Indication: For GERD I will START or STAY ON the medications listed below when I get home from the hospital:    levETIRAcetam 750 mg oral tablet  -- 1 tab(s) by mouth 2 times a day  -- Indication: For Seizure    atorvastatin 40 mg oral tablet  -- 1 tab(s) by mouth once a day  -- Indication: For Hyperlipidemia    amLODIPine 10 mg oral tablet  -- 1 tab(s) by mouth once a day  -- Indication: For HTN (hypertension)    furosemide 20 mg oral tablet  -- 1 tab(s) by mouth once a day  -- Indication: For HTN (hypertension)    melatonin 3 mg oral tablet  -- 1 tab(s) by mouth once a day (at bedtime), As needed, Insomnia  -- Indication: For Insomnia    amoxicillin 500 mg oral capsule  -- 1 cap(s) by mouth every 12 hours through 8/8/17  -- Indication: For UTI (urinary tract infection)    lactobacillus acidophilus oral capsule  -- 1 cap(s) by mouth once a day through 8/8/17  -- Indication: For Probiotic while on antibiotic    pantoprazole 40 mg oral delayed release tablet  -- 1 tab(s) by mouth once a day  -- Indication: For Acid reflux

## 2017-07-26 NOTE — DISCHARGE NOTE ADULT - HOSPITAL COURSE
89M with HTN and CAD s/p CABG, recent month-long ICU admission at [facility], who presented s/p fall at home and seizure at his neurologist's office. Per ED documentation, on arrival in the ED pt complained of headache and left sided weakness since his seizure but was conversant and AAOx3 at the time. Code stroke was called and pt was deemed to not be a TPA candidate. CT head revealed acute R superior frontal gyrus subarachnoid hematoma without midline shift along with subarachnoid and interhemispheric/right tentorial subdural hemorrhage. Pt was evaluated by neurology neurosurgery who recommended repeat CT head in the AM. Patient admitted to MICU for Q2 hour neuro checks. Patient now stable for transfer to the floor. HPI:  89M with HTN and CAD s/p CABG, recent month-long ICU admission at [facility], presents following fall at home and seizure at his neurologist's office. Per ED documentation, on arrival in the ED pt complained of headache and left sided weakness since his seizure but was conversant and AAOx3 at the time. Code stroke was called and pt was deemed to not be a TPA candidate. CT head revealed acute R superior frontal gyrus subarachnoid hematoma without midline shift along with subarachnoid and interhemispheric/right tentorial subdural hemorrhage. Pt was evaluated by neurology neurosurgery who recommended repeat CT head in the AM.     Pt was lethargic and AAOx1, called wife for further history. Pt reportedly got out of bed to early this AM to bring her a glass of water but fell on the carpeted floor of her bedroom and was unable to get up. Pt's wife called EMS who offered to bring pt to the hospital but pt and wife refused, preferring instead to wait for his neurology appointment at 2pm. Pt's wife was somehow able to bring him to his neurology appointment, where he had the left-sided focal seizure and was sent to the ED.      HOSPITAL COURSE:  Vital Signs Last 24 Hrs  T(C): 36.7 (31 Jul 2017 13:06), Max: 36.9 (30 Jul 2017 17:57)  T(F): 98 (31 Jul 2017 13:06), Max: 98.4 (30 Jul 2017 17:57)  HR: 88 (31 Jul 2017 13:06) (84 - 88)  BP: 130/73 (31 Jul 2017 13:06) (122/65 - 146/69)  BP(mean): --  RR: 18 (31 Jul 2017 13:06) (18 - 18)  SpO2: 99% (31 Jul 2017 13:06) (96% - 99%)      Patient currently denies chest pain, sob, palpitations, dizziness or lightheadedness. Case d/w attending (JOSE ROBERTO Nolen): Pt to be dc'd to rehab and F/U with PCP there and Neurologist in 2-4 weeks. HPI: 89M with HTN and CAD s/p CABG, recent month-long ICU admission at [facility]?, presents following fall at home and seizure at his neurologist's office. Per ED documentation, on arrival in the ED pt complained of headache and left sided weakness since his seizure but was conversant and AAOx3 at the time. Code stroke was called and pt was deemed to not be a TPA candidate. CT head revealed acute R superior frontal gyrus subarachnoid hematoma without midline shift along with subarachnoid and interhemispheric/right tentorial subdural hemorrhage. Pt was evaluated by neurology neurosurgery who recommended repeat CT head in the AM. Pt was lethargic and AAOx1, called wife for further history. Pt reportedly got out of bed to early this AM to bring her a glass of water but fell on the carpeted floor of her bedroom and was unable to get up. Pt's wife called EMS who offered to bring pt to the hospital but pt and wife refused, preferring instead to wait for his neurology appointment at 2pm. Pt's wife was somehow able to bring him to his neurology appointment, where he had the left-sided focal seizure and was sent to the ED.    In the ED, CTH was done which displayed: Acute right superior frontal gyrus hematoma, with associated vasogenic edema. No midline shift. No hydrocephalus. While this may be due to hemorrhagic infarction or hypertensive hemorrhage, neoplasm or vascular malformation cannot be excluded. Recommend contrast brain MRI and or head CTA for further evaluation. Subarachnoid and interhemispheric/right tentorial subdural hemorrhage is also seen.    Pt was admitted to MICU: Pt was seen by neurology, recommended BP management, MRI/MRA H&N. Pt on Keppra at 1000 mg BID, routine EEG, neurochecks. Anticoagulation and antiplatelets were held. Speech evaluation recommended regular diet with thin liquid. Repeat CTH with stable hemorrhage.   MRI/MRA head and neck displayed stable intracranial hemorrhages compared to the most recent head CT study. No evidence for significant intracranial or extracranial stenosis on the MRA studies. PT recommended rehab facility. Routine EEG no epileptic activity, moderate diffuse slowing.    Unknown baseline Cr, unable to reach outpt MD. Cr on admission 2.55--> now 2.26, renal u/s displayed parenchymal renal disease. Atrophic parenchyma.  Medicine recommended check UA, Urine studies, Will give 500 cc fluid challenge of LR (has concomitant hyperchloremia and decreased bicarbonate, will refrain from 0.9% NS/ Patient with UTI + Aerococcus Urinae. This organism is a pathogenic cause of UTI that commonly effects elderly and is susceptible to Amoxicillin. Patient not complaining of symptoms, but given increasing creatinine will empirically treat with Amoxicillin 500 mg PO q12h for 10 days. No DVT ppx indicated at this time given ICH.     TTE revealed EF 66%. Mitral annular calcification, otherwise normal mitral valve. Minimal mitral regurgitation. Normal left ventricular internal dimensions and wall  thicknesses. Endocardium not well visualized; grossly normal left ventricular systolic function. The right ventricle is not well visualized; grossly normal right ventricular systolic function.     Case d/w attending (JOSE ROBERTO Nolen): Pt to be dc'd to rehab and follow up with PCP there and Neurologist in 2-4 weeks.

## 2017-07-26 NOTE — DISCHARGE NOTE ADULT - MEDICATION SUMMARY - MEDICATIONS TO STOP TAKING
I will STOP taking the medications listed below when I get home from the hospital:  None I will STOP taking the medications listed below when I get home from the hospital:    gabapentin 600 mg oral tablet  -- 1 tab(s) by mouth 3 times a day    metoprolol tartrate 50 mg oral tablet  -- 1 tab(s) by mouth 2 times a day

## 2017-07-26 NOTE — CHART NOTE - NSCHARTNOTEFT_GEN_A_CORE
89M with HTN and CAD s/p CABG, recent month-long ICU admission at [facility], who presented s/p fall at home and seizure at his neurologist's office. Per ED documentation, on arrival in the ED pt complained of headache and left sided weakness since his seizure but was conversant and AAOx3 at the time. Code stroke was called and pt was deemed to not be a TPA candidate. CT head revealed acute R superior frontal gyrus subarachnoid hematoma without midline shift along with subarachnoid and interhemispheric/right tentorial subdural hemorrhage. Pt was evaluated by neurology neurosurgery who recommended repeat CT head in the AM. Patient admitted to MICU for Q2 hour neuro checks. Patient now stable for transfer to the floor.  - follow up neurology recs  - follow up neurosurgery recs  - follow up repeat CT head

## 2017-07-26 NOTE — DISCHARGE NOTE ADULT - CARE PLAN
Principal Discharge DX:	Intracranial hemorrhage  Goal:	prevent recurrent CVA  Instructions for follow-up, activity and diet:	Take medications as instructed on discharge  Follow up with PCP at rehab.  Follow up with Dr. Segura (Neurologist) in 2-4 weeks.  Secondary Diagnosis:	HTN (hypertension)  Goal:	Keep BP Less than 140/90  Instructions for follow-up, activity and diet:	Take medications as instructed on discharge  Follow up with PCP at rehab.  Secondary Diagnosis:	UTI (urinary tract infection)  Instructions for follow-up, activity and diet:	Continue full regimen of Antibiotic Amoxicillin and Follow up with PCP at rehab.  Secondary Diagnosis:	CAD (coronary artery disease)  Instructions for follow-up, activity and diet:	Take medications as instructed on discharge  Follow up with PCP at rehab.  Secondary Diagnosis:	Focal seizure  Instructions for follow-up, activity and diet:	Take medications as instructed on discharge  Follow up with PCP at rehab.  Follow up with Dr. Segura (Neurologist) in 2-4 weeks. Principal Discharge DX:	Intracranial hemorrhage  Goal:	prevent recurrent CVA  Instructions for follow-up, activity and diet:	Take medications as instructed on discharge  Follow up with PCP at rehab.  Follow up with Dr. Segura (Neurologist) in 2-4 weeks.  Secondary Diagnosis:	HTN (hypertension)  Goal:	Keep BP Less than 140/90  Instructions for follow-up, activity and diet:	Take medications as instructed on discharge  Follow up with PCP at rehab.  Secondary Diagnosis:	UTI (urinary tract infection)  Instructions for follow-up, activity and diet:	Continue full regimen of Antibiotic Amoxicillin (Total of 10 days) and Follow up with PCP at rehab. Last day on 8/8/17.  Secondary Diagnosis:	CAD (coronary artery disease)  Instructions for follow-up, activity and diet:	Take medications as instructed on discharge  Follow up with PCP at rehab.  Secondary Diagnosis:	Focal seizure  Instructions for follow-up, activity and diet:	Take medications as instructed on discharge  Follow up with PCP at rehab.  Follow up with Dr. Segura (Neurologist) in 2-4 weeks. Principal Discharge DX:	Intracranial hemorrhage  Goal:	Prevent recurrence.  Instructions for follow-up, activity and diet:	Take medications as instructed on discharge. Follow up with PCP at rehab. Follow up with vascular neurologist, Dr. Segura or Dr. Sargent in 2-4 weeks. You will need repeat MRI and EEG in 3-6 months.  Secondary Diagnosis:	HTN (hypertension)  Goal:	Continue Norvasc as directed to maintain goal blood pressure 140/90mmhg. Low salt diet.  Instructions for follow-up, activity and diet:	Take medications as instructed on discharge. Follow up with your primary care physician for further monitoring in 1-2 weeks. Please call to arrange appointment.  Secondary Diagnosis:	UTI (urinary tract infection)  Instructions for follow-up, activity and diet:	Complete course of antibiotic Amoxicillin (Total of 10 days) and follow up with your doctor at rehab. Last day on 8/8/17.  Secondary Diagnosis:	CAD (coronary artery disease)  Instructions for follow-up, activity and diet:	Follow up with your primary care physician for further monitoring in 1-2 weeks. Please call to arrange appointment.  Secondary Diagnosis:	Focal seizure  Goal:	Continue Keppra to prevent seizures.  Instructions for follow-up, activity and diet:	Follow up with Dr. Segura (Neurologist) in 2-4 weeks. Please call for an appointment Principal Discharge DX:	Intracranial hemorrhage  Goal:	Prevent recurrence.  Instructions for follow-up, activity and diet:	Take medications as instructed on discharge. Follow up with PCP at rehab. Follow up with vascular neurologist, Dr. Segura or Dr. Sargent in 2-4 weeks. You will need repeat MRI and EEG in 3-6 months.  Secondary Diagnosis:	HTN (hypertension)  Goal:	Continue Norvasc as directed to maintain goal blood pressure 140/90mmhg. Low salt diet.  Instructions for follow-up, activity and diet:	Take medications as instructed on discharge. Follow up with your primary care physician for further monitoring in 1-2 weeks. Please call to arrange appointment.  Secondary Diagnosis:	UTI (urinary tract infection)  Instructions for follow-up, activity and diet:	Complete course of antibiotic, Amoxicillin (Total of 10 days) and follow up with your doctor at rehab. Last day on 8/8/17.  Secondary Diagnosis:	CAD (coronary artery disease)  Goal:	Your antiplatelet medications were held due to bleed.  Instructions for follow-up, activity and diet:	Follow up with your primary care physician/cardiologist for further monitoring in 1-2 weeks. Please call to arrange appointment.  Secondary Diagnosis:	Focal seizure  Goal:	Continue Keppra to prevent seizures.  Instructions for follow-up, activity and diet:	Follow up with Dr. Segura (Neurologist) in 2-4 weeks. Please call for an appointment

## 2017-07-27 DIAGNOSIS — I10 ESSENTIAL (PRIMARY) HYPERTENSION: ICD-10-CM

## 2017-07-27 DIAGNOSIS — I62.9 NONTRAUMATIC INTRACRANIAL HEMORRHAGE, UNSPECIFIED: ICD-10-CM

## 2017-07-27 DIAGNOSIS — R26.9 UNSPECIFIED ABNORMALITIES OF GAIT AND MOBILITY: ICD-10-CM

## 2017-07-27 DIAGNOSIS — Z29.9 ENCOUNTER FOR PROPHYLACTIC MEASURES, UNSPECIFIED: ICD-10-CM

## 2017-07-27 DIAGNOSIS — I25.10 ATHEROSCLEROTIC HEART DISEASE OF NATIVE CORONARY ARTERY WITHOUT ANGINA PECTORIS: ICD-10-CM

## 2017-07-27 LAB
APTT BLD: 27.9 SEC — SIGNIFICANT CHANGE UP (ref 27.5–37.4)
BASOPHILS # BLD AUTO: 0.02 K/UL — SIGNIFICANT CHANGE UP (ref 0–0.2)
BASOPHILS NFR BLD AUTO: 0.3 % — SIGNIFICANT CHANGE UP (ref 0–2)
CHOLEST SERPL-MCNC: 144 MG/DL — SIGNIFICANT CHANGE UP (ref 120–199)
EOSINOPHIL # BLD AUTO: 0.22 K/UL — SIGNIFICANT CHANGE UP (ref 0–0.5)
EOSINOPHIL NFR BLD AUTO: 3.4 % — SIGNIFICANT CHANGE UP (ref 0–6)
FERRITIN SERPL-MCNC: 751 NG/ML — HIGH (ref 30–400)
HBA1C BLD-MCNC: 5.9 % — HIGH (ref 4–5.6)
HCT VFR BLD CALC: 26.9 % — LOW (ref 39–50)
HDLC SERPL-MCNC: 43 MG/DL — SIGNIFICANT CHANGE UP (ref 35–55)
HGB BLD-MCNC: 9.4 G/DL — LOW (ref 13–17)
IMM GRANULOCYTES # BLD AUTO: 0.02 # — SIGNIFICANT CHANGE UP
IMM GRANULOCYTES NFR BLD AUTO: 0.3 % — SIGNIFICANT CHANGE UP (ref 0–1.5)
INR BLD: 0.94 — SIGNIFICANT CHANGE UP (ref 0.88–1.17)
IRON SATN MFR SERPL: 133 UG/DL — LOW (ref 155–535)
IRON SATN MFR SERPL: 24 UG/DL — LOW (ref 45–165)
LIPID PNL WITH DIRECT LDL SERPL: 82 MG/DL — SIGNIFICANT CHANGE UP
LYMPHOCYTES # BLD AUTO: 1 K/UL — SIGNIFICANT CHANGE UP (ref 1–3.3)
LYMPHOCYTES # BLD AUTO: 15.4 % — SIGNIFICANT CHANGE UP (ref 13–44)
MAGNESIUM SERPL-MCNC: 2.1 MG/DL — SIGNIFICANT CHANGE UP (ref 1.6–2.6)
MCHC RBC-ENTMCNC: 34.2 PG — HIGH (ref 27–34)
MCHC RBC-ENTMCNC: 34.9 % — SIGNIFICANT CHANGE UP (ref 32–36)
MCV RBC AUTO: 97.8 FL — SIGNIFICANT CHANGE UP (ref 80–100)
MONOCYTES # BLD AUTO: 0.81 K/UL — SIGNIFICANT CHANGE UP (ref 0–0.9)
MONOCYTES NFR BLD AUTO: 12.4 % — SIGNIFICANT CHANGE UP (ref 2–14)
NEUTROPHILS # BLD AUTO: 4.44 K/UL — SIGNIFICANT CHANGE UP (ref 1.8–7.4)
NEUTROPHILS NFR BLD AUTO: 68.2 % — SIGNIFICANT CHANGE UP (ref 43–77)
NRBC # FLD: 0 — SIGNIFICANT CHANGE UP
PHOSPHATE SERPL-MCNC: 2.4 MG/DL — LOW (ref 2.5–4.5)
PLATELET # BLD AUTO: 146 K/UL — LOW (ref 150–400)
PMV BLD: 10.5 FL — SIGNIFICANT CHANGE UP (ref 7–13)
PROTHROM AB SERPL-ACNC: 10.5 SEC — SIGNIFICANT CHANGE UP (ref 9.8–13.1)
RBC # BLD: 2.75 M/UL — LOW (ref 4.2–5.8)
RBC # FLD: 13.3 % — SIGNIFICANT CHANGE UP (ref 10.3–14.5)
TRIGL SERPL-MCNC: 134 MG/DL — SIGNIFICANT CHANGE UP (ref 10–149)
UIBC SERPL-MCNC: 109 UG/DL — LOW (ref 110–370)
WBC # BLD: 6.51 K/UL — SIGNIFICANT CHANGE UP (ref 3.8–10.5)
WBC # FLD AUTO: 6.51 K/UL — SIGNIFICANT CHANGE UP (ref 3.8–10.5)

## 2017-07-27 PROCEDURE — 99233 SBSQ HOSP IP/OBS HIGH 50: CPT

## 2017-07-27 PROCEDURE — 99222 1ST HOSP IP/OBS MODERATE 55: CPT | Mod: GC

## 2017-07-27 PROCEDURE — 70547 MR ANGIOGRAPHY NECK W/O DYE: CPT | Mod: 26

## 2017-07-27 PROCEDURE — 70551 MRI BRAIN STEM W/O DYE: CPT | Mod: 26

## 2017-07-27 RX ORDER — ATORVASTATIN CALCIUM 80 MG/1
40 TABLET, FILM COATED ORAL AT BEDTIME
Qty: 0 | Refills: 0 | Status: DISCONTINUED | OUTPATIENT
Start: 2017-07-27 | End: 2017-08-01

## 2017-07-27 RX ORDER — AMLODIPINE BESYLATE 2.5 MG/1
2.5 TABLET ORAL ONCE
Qty: 0 | Refills: 0 | Status: COMPLETED | OUTPATIENT
Start: 2017-07-27 | End: 2017-07-27

## 2017-07-27 RX ORDER — AMLODIPINE BESYLATE 2.5 MG/1
2.5 TABLET ORAL DAILY
Qty: 0 | Refills: 0 | Status: DISCONTINUED | OUTPATIENT
Start: 2017-07-27 | End: 2017-07-28

## 2017-07-27 RX ORDER — PANTOPRAZOLE SODIUM 20 MG/1
40 TABLET, DELAYED RELEASE ORAL
Qty: 0 | Refills: 0 | Status: DISCONTINUED | OUTPATIENT
Start: 2017-07-27 | End: 2017-08-01

## 2017-07-27 RX ADMIN — LEVETIRACETAM 400 MILLIGRAM(S): 250 TABLET, FILM COATED ORAL at 06:03

## 2017-07-27 RX ADMIN — AMLODIPINE BESYLATE 2.5 MILLIGRAM(S): 2.5 TABLET ORAL at 22:35

## 2017-07-27 RX ADMIN — LEVETIRACETAM 400 MILLIGRAM(S): 250 TABLET, FILM COATED ORAL at 17:05

## 2017-07-27 RX ADMIN — AMLODIPINE BESYLATE 2.5 MILLIGRAM(S): 2.5 TABLET ORAL at 23:49

## 2017-07-27 RX ADMIN — ATORVASTATIN CALCIUM 40 MILLIGRAM(S): 80 TABLET, FILM COATED ORAL at 22:35

## 2017-07-27 RX ADMIN — Medication 30 MILLILITER(S): at 23:49

## 2017-07-27 NOTE — PROGRESS NOTE ADULT - SUBJECTIVE AND OBJECTIVE BOX
Neurology Consult    Patient is a 89y old  Male who presents with a chief complaint of Seizure, Subarachnoid Hemorrhage (26 Jul 2017 14:47)      HPI:  89M with HTN and CAD s/p CABG, recent month-long ICU admission at [facility], presents following fall at home and seizure at his neurologist's office. Per ED documentation, on arrival in the ED pt complained of headache and left sided weakness since his seizure but was conversant and AAOx3 at the time. Code stroke was called and pt was deemed to not be a TPA candidate. CT head revealed acute R superior frontal gyrus subarachnoid hematoma without midline shift along with subarachnoid and interhemispheric/right tentorial subdural hemorrhage. Pt was evaluated by neurology neurosurgery who recommended repeat CT head in the AM.     Pt is Currently lethargic and AAOx1, called wife for further history. Pt reportedly got out of bed to early this AM to bring her a glass of water but fell on the carpeted floor of her bedroom and was unable to get up. Pt's wife called EMS who offered to bring pt to the hospital but pt and wife refused, preferring instead to wait for his neurology appointment at 2pm. Pt's wife was somehow able to bring him to his neurology appointment, where he had the left-sided focal seizure and was sent to the ED.    Pt currently has normal vital signs. Labs revealed mild anemia and Cr 2.55. Pt was given 2g keppra, DDAVP, and platelets. (25 Jul 2017 22:39)    Interval Hx: No overnight events      PAST MEDICAL & SURGICAL HISTORY:  CAD (coronary artery disease)  HTN (hypertension)  Hyperlipidemia, unspecified hyperlipidemia type  S/P CABG x 5      Allergies    No Known Allergies    Intolerances        MEDICATIONS  (STANDING):  levETIRAcetam  IVPB 1000 milliGRAM(s) IV Intermittent every 12 hours    MEDICATIONS  (PRN):      Social History: Denies tob, EtOH use     FAMILY HISTORY:  No pertinent family history in first degree relatives        Physical Exam:   Vital Signs Last 24 Hrs  T(C): 37.5 (27 Jul 2017 06:05), Max: 37.5 (27 Jul 2017 06:05)  T(F): 99.5 (27 Jul 2017 06:05), Max: 99.5 (27 Jul 2017 06:05)  HR: 68 (27 Jul 2017 06:05) (67 - 74)  BP: 135/70 (27 Jul 2017 06:05) (135/70 - 135/71)  BP(mean): --  RR: 17 (27 Jul 2017 06:05) (16 - 17)  SpO2: 98% (27 Jul 2017 06:05) (96% - 100%)    General Exam   General appearance: No acute distress, well-nourished  Respiratory:    non-labored respirations               Neurological Exam  Mental Status:  alert and oriented x3, fluent speech, following commands    Cranial Nerves: left pupil 2 mm, right pupil 2.5mm, EOMI without nystagmus, visual fields intact, left facial droop, no dysarthria    Motor:   Tone:   normal               Strength:  Upper extremity                          Delt       Bicep    Tricep                                                  R         5/5        5/5        5/5       5/5                                               L          4/5        3/5        3/5      2/5    Lower extremity: both lower extremities drift                          Pronator drift:   left UE, LE       Dysmetria: none with finger-to-nose testing  Tremor:  none appreciated at rest or in action      Labs:     CBC Full  -  ( 27 Jul 2017 06:00 )  WBC Count : 6.51 K/uL  Hemoglobin : 9.4 g/dL  Hematocrit : 26.9 %  Platelet Count - Automated : 146 K/uL  Mean Cell Volume : 97.8 fL  Mean Cell Hemoglobin : 34.2 pg  Mean Cell Hemoglobin Concentration : 34.9 %  Auto Neutrophil # : 4.44 K/uL  Auto Lymphocyte # : 1.00 K/uL  Auto Monocyte # : 0.81 K/uL  Auto Eosinophil # : 0.22 K/uL  Auto Basophil # : 0.02 K/uL  Auto Neutrophil % : 68.2 %  Auto Lymphocyte % : 15.4 %  Auto Monocyte % : 12.4 %  Auto Eosinophil % : 3.4 %  Auto Basophil % : 0.3 %    07-26    144  |  109<H>  |  37<H>  ----------------------------<  115<H>  4.6   |  19<L>  |  2.33<H>    Ca    8.5      26 Jul 2017 01:40  Phos  2.4     07-27  Mg     2.1     07-27    TPro  6.5  /  Alb  3.6  /  TBili  0.8  /  DBili  x   /  AST  20  /  ALT  14  /  AlkPhos  88  07-26    LIVER FUNCTIONS - ( 26 Jul 2017 01:40 )  Alb: 3.6 g/dL / Pro: 6.5 g/dL / ALK PHOS: 88 u/L / ALT: 14 u/L / AST: 20 u/L / GGT: x           PT/INR - ( 27 Jul 2017 06:00 )   PT: 10.5 SEC;   INR: 0.94          PTT - ( 27 Jul 2017 06:00 )  PTT:27.9 SEC

## 2017-07-27 NOTE — PROGRESS NOTE ADULT - PROBLEM SELECTOR PLAN 2
-continue with Keppra at 1000 mg BID: can switch to PO now that he is tolerating diet  -routine EEG  Attempted to call Dr. Bello (outpatient neurologist to update and obtain baseline Cr): was not able to reach at [phone]

## 2017-07-27 NOTE — PROGRESS NOTE ADULT - PROBLEM SELECTOR PLAN 1
holding A/C  Rpt CTH with stable hemorrhage  Keep SBP<140, neurochecks  Pending MRI/MRA brain and neck, TTE  Neuro recs appreciated, evaluated by neurosurgery  Speech/swallow recs appreciated: regular diet with thin liquids  PT recs: rehab facility

## 2017-07-27 NOTE — PROGRESS NOTE ADULT - ASSESSMENT
89 year old male with history of HTN, BPH, CAD presents with sudden-onset of left focal UE seizure and left sided facial droop and weakness found to have a R frontal IPH. Volume 8.5 ml. IC h score: 1. Patient currently neurologically stable.   -BP management < 140/90  -MRI brain with and without contrast/MRA neck and head - Neck with contrast  -continue with Keppra at 1000 mg BID  -routine EEG  -Neurochecks  -C/w PT/OT

## 2017-07-27 NOTE — PROGRESS NOTE ADULT - SUBJECTIVE AND OBJECTIVE BOX
Patient is a 89y old  Male who presents with a chief complaint of Seizure, Subarachnoid Hemorrhage (26 Jul 2017 14:47)      SUBJECTIVE / OVERNIGHT EVENTS: No acute events overnight    MEDICATIONS  (STANDING):  levETIRAcetam  IVPB 1000 milliGRAM(s) IV Intermittent every 12 hours    MEDICATIONS  (PRN):      T(C): 36.3 (07-27-17 @ 14:19), Max: 37.5 (07-27-17 @ 06:05)  HR: 71 (07-27-17 @ 14:19) (67 - 74)  BP: 143/79 (07-27-17 @ 14:19) (135/70 - 143/79)  RR: 16 (07-27-17 @ 14:19) (16 - 17)  SpO2: 97% (07-27-17 @ 14:19) (96% - 100%)  CAPILLARY BLOOD GLUCOSE        I&O's Summary    26 Jul 2017 07:01  -  27 Jul 2017 07:00  --------------------------------------------------------  IN: 1020 mL / OUT: 650 mL / NET: 370 mL    27 Jul 2017 07:01  -  27 Jul 2017 15:54  --------------------------------------------------------  IN: 686 mL / OUT: 295 mL / NET: 391 mL        PHYSICAL EXAM:  GENERAL: no apparent distress, on room air  HEAD:  Atraumatic, Normocephalic  EYES: EOMI, PERRL, conjunctiva and sclera clear b/l  CHEST/LUNG: Clear to auscultation bilaterally; No wheezing or crackles  HEART: s1/s2, no murmurs appreciated  ABDOMEN: Soft, Nontender, Nondistended; Bowel sounds present  EXTREMITIES:  2+ Peripheral Pulses, No clubbing, cyanosis, or edema  NEUROLOGY: awake, alert, responds to Qs appropriately, left upper extremity weaker than right, lower extremities 5/5 in muscle strength, no dysarthria  PSYCH: AAOX3    LABS:                        9.4    6.51  )-----------( 146      ( 27 Jul 2017 06:00 )             26.9     07-26    144  |  109<H>  |  37<H>  ----------------------------<  115<H>  4.6   |  19<L>  |  2.33<H>    Ca    8.5      26 Jul 2017 01:40  Phos  2.4     07-27  Mg     2.1     07-27    TPro  6.5  /  Alb  3.6  /  TBili  0.8  /  DBili  x   /  AST  20  /  ALT  14  /  AlkPhos  88  07-26    PT/INR - ( 27 Jul 2017 06:00 )   PT: 10.5 SEC;   INR: 0.94          PTT - ( 27 Jul 2017 06:00 )  PTT:27.9 SEC          RADIOLOGY & ADDITIONAL TESTS:

## 2017-07-27 NOTE — CONSULT NOTE ADULT - SUBJECTIVE AND OBJECTIVE BOX
Shaq Lowe is a 89 year old ,  man with a PMH of HTN, HLD, CAD s/p CABG with 5 stents in 2007, who presented to the [facility] ED on July 25th with intracranial hemorrhage. On the morning of July 25th, the patient was getting back into bed at 5:30AM after using the bathroom, when his wife reports that he couldn’t manage to get onto the bed. She suspects it was due to their new mattress which is much higher than their last one. She said that he was slumped over the bed, unable to get up, and when his only response to her calling his name was to give her a blank stare. She tried to help him get into bed, but he slid out of her arms and ended up lying on the carpeted floor. She denies that he ever hit his head or lost consciousness. She called EMS who arrived and were able to get him up into the bed. She said that they had a neurology appointment at 2pm that day and that she felt like it would be better to see him than to go to the ED with EMS. She said she managed to get him into the car and to the neurologist’s office for his appointment, "by sheer force of will". While in the office, she said that the patient’s left arm bent at the elbow and began shaking, and he almost slipped off of the examining table. At this time, she said that the neurologist called 911 and EMS came to take the pt to the [facility] ED. Upon arrival to the ED, a stroke code was called with a total score of 4 for minor paralysis, some effort against gravity and drift. CT head revealed acute R superior frontal gyrus subarachnoid hematoma without midline shift along with subarachnoid and interhemispheric/right tentorial subdural hemorrhage. Pt was evaluated by neurology neurosurgery who recommended repeat CTH in the AM which showed stable intracranial hemorrhages. Pt denies chest pain and shortness of breath.           REVIEW OF SYSTEMS: No chest pain, shortness of breath, nausea, vomiting or diarhea.      PAST MEDICAL & SURGICAL HISTORY  CAD (coronary artery disease)  HTN (hypertension)  Hyperlipidemia, unspecified hyperlipidemia type  S/P CABG x 5      SOCIAL HISTORY  Smoking - Denied, EtOH - Denied, Drugs - Denied    FUNCTIONAL HISTORY:   Lives with spouse, no stairs   Independent PTA     CURRENT FUNCTIONAL STATUS: min assist       FAMILY HISTORY   No pertinent family history in first degree relatives      RECENT LABS/IMAGING  CBC Full  -  ( 27 Jul 2017 06:00 )  WBC Count : 6.51 K/uL  Hemoglobin : 9.4 g/dL  Hematocrit : 26.9 %  Platelet Count - Automated : 146 K/uL  Mean Cell Volume : 97.8 fL  Mean Cell Hemoglobin : 34.2 pg  Mean Cell Hemoglobin Concentration : 34.9 %  Auto Neutrophil # : 4.44 K/uL  Auto Lymphocyte # : 1.00 K/uL  Auto Monocyte # : 0.81 K/uL  Auto Eosinophil # : 0.22 K/uL  Auto Basophil # : 0.02 K/uL  Auto Neutrophil % : 68.2 %  Auto Lymphocyte % : 15.4 %  Auto Monocyte % : 12.4 %  Auto Eosinophil % : 3.4 %  Auto Basophil % : 0.3 %    07-26    144  |  109<H>  |  37<H>  ----------------------------<  115<H>  4.6   |  19<L>  |  2.33<H>    Ca    8.5      26 Jul 2017 01:40  Phos  2.4     07-27  Mg     2.1     07-27    TPro  6.5  /  Alb  3.6  /  TBili  0.8  /  DBili  x   /  AST  20  /  ALT  14  /  AlkPhos  88  07-26        VITALS  T(C): 36.3 (07-27-17 @ 14:19), Max: 37.5 (07-27-17 @ 06:05)  HR: 71 (07-27-17 @ 14:19) (67 - 74)  BP: 143/79 (07-27-17 @ 14:19) (135/70 - 143/79)  RR: 16 (07-27-17 @ 14:19) (16 - 17)  SpO2: 97% (07-27-17 @ 14:19) (96% - 100%)  Wt(kg): --    ALLERGIES  No Known Allergies      MEDICATIONS   levETIRAcetam  IVPB 1000 milliGRAM(s) IV Intermittent every 12 hours  atorvastatin 40 milliGRAM(s) Oral at bedtime  pantoprazole   Suspension 40 milliGRAM(s) Oral before breakfast      ----------------------------------------------------------------------------------------  PHYSICAL EXAM  Constitutional - NAD, Comfortable  HEENT - NCAT, EOMI  Neck - Supple, No limited ROM  Chest - CTA bilaterally, No wheeze, No rhonchi, No crackles  Cardiovascular - RRR, S1S2, No murmurs  Abdomen - BS+, Soft, NTND  Extremities - No C/C/E, No calf tenderness. + echymosis b/l UE   Neurologic Exam -                    Cognitive - Awake, Alert, AAO to self, place, date, year, situation     Communication - Fluent, No dysarthria, no aphasia     Cranial Nerves - CN 2-12 intact     Motor - LUE 5-/5, LLE 4/5, RUE/RLE 5/5                       Sensory - Intact to LT     Reflexes - DTR Intact, No primitive reflexive     Balance - WNL Static, poost standing balance   Psychiatric - Mood stable, Affect WNL

## 2017-07-27 NOTE — SWALLOW BEDSIDE ASSESSMENT ADULT - SWALLOW EVAL: DIAGNOSIS
Patient presents with functional oral and pharyngeal stage swallowing characterized by adequate oral containment, adequate chewing for solid, adequate bolus manipulation and transport with adequate oral clearance. There is laryngeal elevation upon palpation, initiation of the pharyngeal swallow. There were no overt signs of impaired airway protection.

## 2017-07-27 NOTE — CONSULT NOTE ADULT - PROBLEM SELECTOR RECOMMENDATION 9
1. PT- bed mobility,transfers, gait and balance training  2. OT- ADL'S  3.SAH/SDH- continue Keppra. Qualify future DVT px with NSG  4. Patient would benefit from acute rehab, needs a multidisciplinary team including PT, OT and speech. Can tolerate 3 hours of therapy a day.  Will follow.
1. Stroke Neurology consult  2. MRI brain w/lucero  3. repeat CTH in am  4. con't AED  5. Case to be d/w attending  6. Hold ASA
[] Strict BP control goal <140/90 w/ Cardene drip  [] Repeat CTH in 24hrs or prn for worsening mental status or neuro exam  [] q2 neuro checks, vitals  [] hold ASA, lovenox  [] TTE  [] Telemetry Monitoring  [] SCD's for DVT prophylaxis  [] MRI brain w/ and w/o cont.  [] HgA1c, Lipid profile

## 2017-07-27 NOTE — PROGRESS NOTE ADULT - ASSESSMENT
89 year old male with history of HTN, BPH, CAD presents with sudden-onset of left focal UE seizure and left sided facial droop and weakness found to have a R frontal IPH. S/p MICU transfer

## 2017-07-27 NOTE — SWALLOW BEDSIDE ASSESSMENT ADULT - COMMENTS
Dx: Subarachnoid Hemorrhage    89M with HTN and CAD s/p CABG, recent ICU admission at [facility] reportedly for respiratory failure requiring intubation and epistaxis requiring multiple transfusions presents with fall and subsequent seizures, code stroke in ED, found to have acute R frontal intraparenchymal hemorrhage. Dx: Subarachnoid Hemorrhage    89M with HTN and CAD s/p CABG, recent ICU admission at [facility] for respiratory failure requiring intubation and epistaxis requiring multiple transfusions presents with fall and subsequent seizures, code stroke in ED, found to have acute R frontal intraparenchymal hemorrhage.    Patient seen at bedside, alert and oriented. Patient is able to follow commands and express needs. Patient's wife is also present. 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

## 2017-07-28 DIAGNOSIS — R09.89 OTHER SPECIFIED SYMPTOMS AND SIGNS INVOLVING THE CIRCULATORY AND RESPIRATORY SYSTEMS: ICD-10-CM

## 2017-07-28 LAB
APPEARANCE UR: SIGNIFICANT CHANGE UP
BACTERIA # UR AUTO: SIGNIFICANT CHANGE UP
BASOPHILS # BLD AUTO: 0.03 K/UL — SIGNIFICANT CHANGE UP (ref 0–0.2)
BASOPHILS NFR BLD AUTO: 0.5 % — SIGNIFICANT CHANGE UP (ref 0–2)
BILIRUB UR-MCNC: NEGATIVE — SIGNIFICANT CHANGE UP
BLOOD UR QL VISUAL: NEGATIVE — SIGNIFICANT CHANGE UP
BUN SERPL-MCNC: 33 MG/DL — HIGH (ref 7–23)
CALCIUM SERPL-MCNC: 8.6 MG/DL — SIGNIFICANT CHANGE UP (ref 8.4–10.5)
CHLORIDE SERPL-SCNC: 110 MMOL/L — HIGH (ref 98–107)
CO2 SERPL-SCNC: 18 MMOL/L — LOW (ref 22–31)
COLOR SPEC: YELLOW — SIGNIFICANT CHANGE UP
CREAT SERPL-MCNC: 1.99 MG/DL — HIGH (ref 0.5–1.3)
EOSINOPHIL # BLD AUTO: 0.27 K/UL — SIGNIFICANT CHANGE UP (ref 0–0.5)
EOSINOPHIL NFR BLD AUTO: 4.2 % — SIGNIFICANT CHANGE UP (ref 0–6)
EPI CELLS # UR: SIGNIFICANT CHANGE UP
GLUCOSE SERPL-MCNC: 135 MG/DL — HIGH (ref 70–99)
GLUCOSE UR-MCNC: NEGATIVE — SIGNIFICANT CHANGE UP
HCT VFR BLD CALC: 30 % — LOW (ref 39–50)
HGB BLD-MCNC: 10.3 G/DL — LOW (ref 13–17)
IMM GRANULOCYTES # BLD AUTO: 0.02 # — SIGNIFICANT CHANGE UP
IMM GRANULOCYTES NFR BLD AUTO: 0.3 % — SIGNIFICANT CHANGE UP (ref 0–1.5)
KETONES UR-MCNC: NEGATIVE — SIGNIFICANT CHANGE UP
LEUKOCYTE ESTERASE UR-ACNC: HIGH
LYMPHOCYTES # BLD AUTO: 1.2 K/UL — SIGNIFICANT CHANGE UP (ref 1–3.3)
LYMPHOCYTES # BLD AUTO: 18.5 % — SIGNIFICANT CHANGE UP (ref 13–44)
MAGNESIUM SERPL-MCNC: 1.9 MG/DL — SIGNIFICANT CHANGE UP (ref 1.6–2.6)
MCHC RBC-ENTMCNC: 33.2 PG — SIGNIFICANT CHANGE UP (ref 27–34)
MCHC RBC-ENTMCNC: 34.3 % — SIGNIFICANT CHANGE UP (ref 32–36)
MCV RBC AUTO: 96.8 FL — SIGNIFICANT CHANGE UP (ref 80–100)
MONOCYTES # BLD AUTO: 0.75 K/UL — SIGNIFICANT CHANGE UP (ref 0–0.9)
MONOCYTES NFR BLD AUTO: 11.5 % — SIGNIFICANT CHANGE UP (ref 2–14)
MUCOUS THREADS # UR AUTO: SIGNIFICANT CHANGE UP
NEUTROPHILS # BLD AUTO: 4.23 K/UL — SIGNIFICANT CHANGE UP (ref 1.8–7.4)
NEUTROPHILS NFR BLD AUTO: 65 % — SIGNIFICANT CHANGE UP (ref 43–77)
NITRITE UR-MCNC: NEGATIVE — SIGNIFICANT CHANGE UP
NRBC # FLD: 0 — SIGNIFICANT CHANGE UP
PH UR: 7 — SIGNIFICANT CHANGE UP (ref 5–8)
PLATELET # BLD AUTO: 161 K/UL — SIGNIFICANT CHANGE UP (ref 150–400)
PMV BLD: 10.5 FL — SIGNIFICANT CHANGE UP (ref 7–13)
POTASSIUM SERPL-MCNC: 4.4 MMOL/L — SIGNIFICANT CHANGE UP (ref 3.5–5.3)
POTASSIUM SERPL-SCNC: 4.4 MMOL/L — SIGNIFICANT CHANGE UP (ref 3.5–5.3)
PROT UR-MCNC: 300 — SIGNIFICANT CHANGE UP
RBC # BLD: 3.1 M/UL — LOW (ref 4.2–5.8)
RBC # FLD: 13.1 % — SIGNIFICANT CHANGE UP (ref 10.3–14.5)
RBC CASTS # UR COMP ASSIST: HIGH (ref 0–?)
SODIUM SERPL-SCNC: 142 MMOL/L — SIGNIFICANT CHANGE UP (ref 135–145)
SP GR SPEC: 1.01 — SIGNIFICANT CHANGE UP (ref 1–1.03)
SQUAMOUS # UR AUTO: SIGNIFICANT CHANGE UP
UROBILINOGEN FLD QL: NORMAL E.U. — SIGNIFICANT CHANGE UP (ref 0.2–1)
WBC # BLD: 6.5 K/UL — SIGNIFICANT CHANGE UP (ref 3.8–10.5)
WBC # FLD AUTO: 6.5 K/UL — SIGNIFICANT CHANGE UP (ref 3.8–10.5)
WBC UR QL: HIGH (ref 0–?)

## 2017-07-28 PROCEDURE — 99232 SBSQ HOSP IP/OBS MODERATE 35: CPT | Mod: GC

## 2017-07-28 PROCEDURE — 99233 SBSQ HOSP IP/OBS HIGH 50: CPT

## 2017-07-28 PROCEDURE — 95957 EEG DIGITAL ANALYSIS: CPT | Mod: 26

## 2017-07-28 PROCEDURE — 95819 EEG AWAKE AND ASLEEP: CPT | Mod: 26

## 2017-07-28 RX ORDER — AMLODIPINE BESYLATE 2.5 MG/1
5 TABLET ORAL ONCE
Qty: 0 | Refills: 0 | Status: COMPLETED | OUTPATIENT
Start: 2017-07-28 | End: 2017-07-28

## 2017-07-28 RX ORDER — LEVETIRACETAM 250 MG/1
1000 TABLET, FILM COATED ORAL
Qty: 0 | Refills: 0 | Status: DISCONTINUED | OUTPATIENT
Start: 2017-07-28 | End: 2017-07-28

## 2017-07-28 RX ORDER — METOPROLOL TARTRATE 50 MG
25 TABLET ORAL ONCE
Qty: 0 | Refills: 0 | Status: COMPLETED | OUTPATIENT
Start: 2017-07-28 | End: 2017-07-28

## 2017-07-28 RX ORDER — AMLODIPINE BESYLATE 2.5 MG/1
5 TABLET ORAL DAILY
Qty: 0 | Refills: 0 | Status: DISCONTINUED | OUTPATIENT
Start: 2017-07-28 | End: 2017-07-28

## 2017-07-28 RX ORDER — AMLODIPINE BESYLATE 2.5 MG/1
10 TABLET ORAL DAILY
Qty: 0 | Refills: 0 | Status: DISCONTINUED | OUTPATIENT
Start: 2017-07-29 | End: 2017-08-01

## 2017-07-28 RX ORDER — LEVETIRACETAM 250 MG/1
750 TABLET, FILM COATED ORAL
Qty: 0 | Refills: 0 | Status: DISCONTINUED | OUTPATIENT
Start: 2017-07-28 | End: 2017-07-30

## 2017-07-28 RX ORDER — HYDRALAZINE HCL 50 MG
10 TABLET ORAL ONCE
Qty: 0 | Refills: 0 | Status: COMPLETED | OUTPATIENT
Start: 2017-07-28 | End: 2017-07-28

## 2017-07-28 RX ORDER — HYDRALAZINE HCL 50 MG
10 TABLET ORAL EVERY 6 HOURS
Qty: 0 | Refills: 0 | Status: DISCONTINUED | OUTPATIENT
Start: 2017-07-28 | End: 2017-08-01

## 2017-07-28 RX ADMIN — Medication 25 MILLIGRAM(S): at 03:22

## 2017-07-28 RX ADMIN — PANTOPRAZOLE SODIUM 40 MILLIGRAM(S): 20 TABLET, DELAYED RELEASE ORAL at 05:11

## 2017-07-28 RX ADMIN — AMLODIPINE BESYLATE 5 MILLIGRAM(S): 2.5 TABLET ORAL at 05:11

## 2017-07-28 RX ADMIN — AMLODIPINE BESYLATE 5 MILLIGRAM(S): 2.5 TABLET ORAL at 10:06

## 2017-07-28 RX ADMIN — LEVETIRACETAM 750 MILLIGRAM(S): 250 TABLET, FILM COATED ORAL at 19:12

## 2017-07-28 RX ADMIN — Medication 10 MILLIGRAM(S): at 01:33

## 2017-07-28 RX ADMIN — ATORVASTATIN CALCIUM 40 MILLIGRAM(S): 80 TABLET, FILM COATED ORAL at 21:02

## 2017-07-28 NOTE — PROGRESS NOTE ADULT - PROBLEM SELECTOR PLAN 1
1. PT- bed mobility,transfers, gait and balance training  2. OT- ADL'S  3. SDH- Keppra, f/u MRI   4. Patient would benefit from acute rehab, needs a multidisciplinary team including PT, OT Can tolerate 3 hours of therapy a day.  Will follow.

## 2017-07-28 NOTE — PROGRESS NOTE ADULT - PROBLEM SELECTOR PLAN 1
-BP management < 140/90 - added PRN hydralazine for SBP over 150 and increased norvasc to 10mg daily (extra dose given this AM)  -Neurochecks  -C/w PT/OT

## 2017-07-28 NOTE — PROGRESS NOTE ADULT - PROBLEM SELECTOR PLAN 1
holding A/C  Rpt CTH with stable hemorrhage  Keep SBP<140, neurochecks  MRI/MRA brain and neck: stable  TTE pending   Neuro recs appreciated, evaluated by neurosurgery  Speech/swallow recs appreciated: regular diet with thin liquids  PT recs: rehab facility

## 2017-07-28 NOTE — PROGRESS NOTE ADULT - SUBJECTIVE AND OBJECTIVE BOX
Patient is a 89y old  Male who presents with a chief complaint of Seizure, Subarachnoid Hemorrhage (2017 14:47)      SUBJECTIVE / OVERNIGHT EVENTS:  Pt feels well, no complaints.     MEDICATIONS  (STANDING):  atorvastatin 40 milliGRAM(s) Oral at bedtime  pantoprazole   Suspension 40 milliGRAM(s) Oral before breakfast  levETIRAcetam 1000 milliGRAM(s) Oral two times a day    MEDICATIONS  (PRN):  aluminum hydroxide/magnesium hydroxide/simethicone Suspension 30 milliLiter(s) Oral every 4 hours PRN Dyspepsia  hydrALAZINE Injectable 10 milliGRAM(s) IV Push every 6 hours PRN SBP >150      T(C): 37.4 (17 @ 05:09), Max: 37.4 (17 @ 05:09)  HR: 77 (17 @ 10:05) (76 - 104)  BP: 135/73 (17 @ 10:05) (135/73 - 170/93)  RR: 18 (17 @ 05:09) (18 - 18)  SpO2: 98% (17 @ 05:09) (98% - 98%)  CAPILLARY BLOOD GLUCOSE        I&O's Summary    2017 07:01  -  2017 07:00  --------------------------------------------------------  IN: 906 mL / OUT: 795 mL / NET: 111 mL        PHYSICAL EXAM:  GENERAL: NAD, well-developed  HEAD:  Atraumatic, Normocephalic  CHEST/LUNG: non-labored in breathing; No wheeze  HEART: s1 s2, regular rhythm and rate   ABDOMEN: Soft, Nontender, Nondistended; Bowel sounds present  EXTREMITIES: No clubbing, cyanosis, or edema  PSYCH: awake, alert, calm   NEUROLOGY: non-focal  SKIN: No rashes or lesions    LABS:                        10.3   6.50  )-----------( 161      ( 2017 06:10 )             30.0     07-    142  |  110<H>  |  33<H>  ----------------------------<  135<H>  4.4   |  18<L>  |  1.99<H>    Ca    8.6      2017 06:10  Phos  2.4     07-  Mg     1.9     -      PT/INR - ( 2017 06:00 )   PT: 10.5 SEC;   INR: 0.94          PTT - ( 2017 06:00 )  PTT:27.9 SEC      Urinalysis Basic - ( 2017 04:00 )    Color: YELLOW / Appearance: HAZY / S.012 / pH: 7.0  Gluc: NEGATIVE / Ketone: NEGATIVE  / Bili: NEGATIVE / Urobili: NORMAL E.U.   Blood: NEGATIVE / Protein: 300 / Nitrite: NEGATIVE   Leuk Esterase: SMALL / RBC: 3-5 / WBC 10-20   Sq Epi: FEW / Non Sq Epi: FEW / Bacteria: FEW        RADIOLOGY & ADDITIONAL TESTS:    Imaging Personally Reviewed:    Consultant(s) Notes Reviewed:      Care Discussed with Consultants/Other Providers: Patient is a 89y old  Male who presents with a chief complaint of Seizure, Subarachnoid Hemorrhage (2017 14:47)      SUBJECTIVE / OVERNIGHT EVENTS:  Pt feels well, no complaints.     MEDICATIONS  (STANDING):  atorvastatin 40 milliGRAM(s) Oral at bedtime  pantoprazole   Suspension 40 milliGRAM(s) Oral before breakfast  levETIRAcetam 1000 milliGRAM(s) Oral two times a day    MEDICATIONS  (PRN):  aluminum hydroxide/magnesium hydroxide/simethicone Suspension 30 milliLiter(s) Oral every 4 hours PRN Dyspepsia  hydrALAZINE Injectable 10 milliGRAM(s) IV Push every 6 hours PRN SBP >150      T(C): 37.4 (17 @ 05:09), Max: 37.4 (17 @ 05:09)  HR: 77 (17 @ 10:05) (76 - 104)  BP: 135/73 (17 @ 10:05) (135/73 - 170/93)  RR: 18 (17 @ 05:09) (18 - 18)  SpO2: 98% (17 @ 05:09) (98% - 98%)  CAPILLARY BLOOD GLUCOSE        I&O's Summary    2017 07:01  -  2017 07:00  --------------------------------------------------------  IN: 906 mL / OUT: 795 mL / NET: 111 mL        PHYSICAL EXAM:  GENERAL: NAD, well-developed  HEAD:  Atraumatic, Normocephalic  CHEST/LUNG: non-labored in breathing; No wheeze  HEART: s1 s2, regular rhythm and rate   ABDOMEN: Soft, Nontender, Nondistended; Bowel sounds present  EXTREMITIES: No clubbing, cyanosis, or edema  PSYCH: awake, alert, calm   NEUROLOGY: non-focal, strength intact  SKIN: No rashes or lesions    LABS:                        10.3   6.50  )-----------( 161      ( 2017 06:10 )             30.0     -    142  |  110<H>  |  33<H>  ----------------------------<  135<H>  4.4   |  18<L>  |  1.99<H>    Ca    8.6      2017 06:10  Phos  2.4     07-  Mg     1.9     07-      PT/INR - ( 2017 06:00 )   PT: 10.5 SEC;   INR: 0.94          PTT - ( 2017 06:00 )  PTT:27.9 SEC      Urinalysis Basic - ( 2017 04:00 )    Color: YELLOW / Appearance: HAZY / S.012 / pH: 7.0  Gluc: NEGATIVE / Ketone: NEGATIVE  / Bili: NEGATIVE / Urobili: NORMAL E.U.   Blood: NEGATIVE / Protein: 300 / Nitrite: NEGATIVE   Leuk Esterase: SMALL / RBC: 3-5 / WBC 10-20   Sq Epi: FEW / Non Sq Epi: FEW / Bacteria: FEW        RADIOLOGY & ADDITIONAL TESTS:    Imaging Personally Reviewed:    Consultant(s) Notes Reviewed:      Care Discussed with Consultants/Other Providers:

## 2017-07-28 NOTE — PROGRESS NOTE ADULT - ASSESSMENT
89 year old male with history of HTN, BPH, CAD presented with sudden-onset of left focal UE seizure and left sided facial droop and weakness found to have a R frontal IPH. Volume 8.5 ml. IC h score: 1. MRI read without evidence of underlying ischemic infarct present.   Patient currently neurologically stable.   -BP management < 140/90  -MRI brain with and without contrast/MRA neck and head - Neck with contrast  -continue with Keppra at 1000 mg BID  -routine EEG  -Neurochecks  -C/w PT/OT 89 year old male with history of HTN, BPH, CAD presented with sudden-onset of left focal UE seizure and left sided facial droop and weakness found to have a R frontal IPH. Volume 8.5 ml. IC h score: 1. MRI read without evidence of underlying ischemic infarct present.   Patient currently neurologically stable.   -BP management < 140/90  -MRI brain with and without contrast/MRA neck and head - Neck with contrast  -Provoked Seizure:-  continue with Keppra at 1000 mg BID  -routine EEG - slowing, no epileptic activity  -Neurochecks  -C/w PT/OT

## 2017-07-28 NOTE — CHART NOTE - NSCHARTNOTEFT_GEN_A_CORE
Pt admitted with subarachnoid hemorrhage seen by neuro recommended to keep BP below 140s/90 systolic. Pt transferred from , upon arrival pt noted with /92. Pt given 5mg of norvasc with no improvement of BP. 2 hr post norvasc BP at 170/93, pt asymptomatic sleeping in bed. Denied N/V, headache, vision change. S1S2 no murmur no gallops, lung CTA bilaterally, abdomen exam benign, 4/5 weakness on the left UE, rest of the extremities 5/5. no pronator drift. Pt given 10mg hydralazine and repeat BP 30min later 157/83. Case d/w night attending will give metoprolol 25mg and repeat in 2 hr. will continue to monitor

## 2017-07-28 NOTE — PROGRESS NOTE ADULT - PROBLEM SELECTOR PLAN 3
unknown baseline Cr  Monitor Cr  avoid nephrotoxin unknown baseline Cr, downtrending   Monitor Cr  avoid nephrotoxin

## 2017-07-28 NOTE — PROGRESS NOTE ADULT - SUBJECTIVE AND OBJECTIVE BOX
Follow up note    Name  LAYTON JACQUES    Patient seen and examined, overnight had increased blood pressure, was given PRN's.                                                        MEDICATIONS  (STANDING):  levETIRAcetam  IVPB 1000 milliGRAM(s) IV Intermittent every 12 hours  atorvastatin 40 milliGRAM(s) Oral at bedtime  pantoprazole   Suspension 40 milliGRAM(s) Oral before breakfast  amLODIPine   Tablet 5 milliGRAM(s) Oral once    MEDICATIONS  (PRN):  aluminum hydroxide/magnesium hydroxide/simethicone Suspension 30 milliLiter(s) Oral every 4 hours PRN Dyspepsia  hydrALAZINE Injectable 10 milliGRAM(s) IV Push every 6 hours PRN SBP >150        Allergies    No Known Allergies    Intolerances        Vital Signs Last 24 Hrs  T(C): 37.4 (28 Jul 2017 05:09), Max: 37.4 (28 Jul 2017 05:09)  T(F): 99.3 (28 Jul 2017 05:09), Max: 99.3 (28 Jul 2017 05:09)  HR: 76 (28 Jul 2017 05:09) (71 - 104)  BP: 151/81 (28 Jul 2017 05:09) (143/79 - 170/93)  BP(mean): --  RR: 18 (28 Jul 2017 05:09) (16 - 18)  SpO2: 98% (28 Jul 2017 05:09) (97% - 98%)    General Exam   General appearance: No acute distress, well-nourished  Respiratory:    non-labored respirations               Neurological Exam  Mental Status:  alert and oriented x3, fluent speech, following commands    Cranial Nerves: left pupil 2 mm, right pupil 2.5mm, EOMI without nystagmus, visual fields intact, left facial droop, no dysarthria    Motor:   Tone:   normal               Strength:  Upper extremity                          Delt       Bicep    Tricep                                                  R         5/5        5/5        5/5       5/5                                               L          4/5        3/5        3/5      2/5    Lower extremity: both lower extremities drift                          Pronator drift:   left UE, LE       Dysmetria: none with finger-to-nose testing  Tremor:  none appreciated at rest or in action      Other Studies    07-28    142  |  110<H>  |  33<H>  ----------------------------<  135<H>  4.4   |  18<L>  |  1.99<H>    Ca    8.6      28 Jul 2017 06:10  Phos  2.4     07-27  Mg     1.9     07-28 07-28    142  |  110<H>  |  33<H>  ----------------------------<  135<H>  4.4   |  18<L>  |  1.99<H>    Ca    8.6      28 Jul 2017 06:10  Phos  2.4     07-27  Mg     1.9     07-28          Radiology    MRI: < from: MRI Head w/o Cont (07.27.17 @ 18:22) >  The parenchymal hematoma surrounding edema in the right superior frontal   gyrus is stable in size compared to the prior CT study. Scattered areas   of subarachnoid hemorrhage are again noted, grossly stable. Minimal   attempts for subdural hemorrhage extending towards the tentorium is   unchanged. No new hemorrhages have occurred over the time interval. There   is no evidence for superimposed acute infarct.    < end of copied text >  < from: MRI Head w/o Cont (07.27.17 @ 18:22) >   No evidence for significant intracranial or extracranial stenosis on   the MRA studies.    < end of copied text > Follow up note    Name  LAYTON JACQUES    Patient seen and examined, overnight had increased blood pressure, was given PRN's. Was also having episodes of confusion, wife concerned because he has "sundowned" in the past.   Wife also reports history severe epistaxis but not diagnosis of bleeding disorder.                                                        MEDICATIONS  (STANDING):  levETIRAcetam  IVPB 1000 milliGRAM(s) IV Intermittent every 12 hours  atorvastatin 40 milliGRAM(s) Oral at bedtime  pantoprazole   Suspension 40 milliGRAM(s) Oral before breakfast  amLODIPine   Tablet 5 milliGRAM(s) Oral once    MEDICATIONS  (PRN):  aluminum hydroxide/magnesium hydroxide/simethicone Suspension 30 milliLiter(s) Oral every 4 hours PRN Dyspepsia  hydrALAZINE Injectable 10 milliGRAM(s) IV Push every 6 hours PRN SBP >150        Allergies    No Known Allergies    Intolerances        Vital Signs Last 24 Hrs  T(C): 37.4 (28 Jul 2017 05:09), Max: 37.4 (28 Jul 2017 05:09)  T(F): 99.3 (28 Jul 2017 05:09), Max: 99.3 (28 Jul 2017 05:09)  HR: 76 (28 Jul 2017 05:09) (71 - 104)  BP: 151/81 (28 Jul 2017 05:09) (143/79 - 170/93)  BP(mean): --  RR: 18 (28 Jul 2017 05:09) (16 - 18)  SpO2: 98% (28 Jul 2017 05:09) (97% - 98%)    General Exam   General appearance: No acute distress, well-nourished  Respiratory:    non-labored respirations               Neurological Exam  Mental Status:  alert and oriented x3, fluent speech, following commands    Cranial Nerves: EOMI without nystagmus, visual fields intact, mild left facial droop, no dysarthria    Motor:   Tone:   normal               Strength:  Upper extremity                          Delt       Bicep    Tricep                                                  R         5/5        5/5        5/5       5/5                                               L          4/5        3/5        3/5      2/5    Lower extremity: both lower extremities drift                          Pronator drift:   left UE, LE       Dysmetria: none with finger-to-nose testing  Tremor:  none appreciated at rest or in action      Other Studies    07-28    142  |  110<H>  |  33<H>  ----------------------------<  135<H>  4.4   |  18<L>  |  1.99<H>    Ca    8.6      28 Jul 2017 06:10  Phos  2.4     07-27  Mg     1.9     07-28 07-28    142  |  110<H>  |  33<H>  ----------------------------<  135<H>  4.4   |  18<L>  |  1.99<H>    Ca    8.6      28 Jul 2017 06:10  Phos  2.4     07-27  Mg     1.9     07-28          Radiology    MRI: < from: MRI Head w/o Cont (07.27.17 @ 18:22) >  The parenchymal hematoma surrounding edema in the right superior frontal   gyrus is stable in size compared to the prior CT study. Scattered areas   of subarachnoid hemorrhage are again noted, grossly stable. Minimal   attempts for subdural hemorrhage extending towards the tentorium is   unchanged. No new hemorrhages have occurred over the time interval. There   is no evidence for superimposed acute infarct.    < end of copied text >  < from: MRI Head w/o Cont (07.27.17 @ 18:22) >   No evidence for significant intracranial or extracranial stenosis on   the MRA studies.    < end of copied text >

## 2017-07-28 NOTE — PROGRESS NOTE ADULT - PROBLEM SELECTOR PLAN 2
-continue with Keppra at 1000 mg BID: can switch to PO now that he is tolerating diet  -routine EEG showed generalized slowing  Attempted to call Dr. Bello (outpatient neurologist to update and obtain baseline Cr): was not able to reach at [phone] -continue with Keppra at 1000 mg BID: can switch to PO now that he is tolerating diet  -routine EEG no epileptic activity, mod diffuse slowing   Attempted to call Dr. Bello (outpatient neurologist to update and obtain baseline Cr): was not able to reach at [phone]

## 2017-07-28 NOTE — PROGRESS NOTE ADULT - PROBLEM SELECTOR PLAN 2
Change keppra to PO 1000 q12 and follow up with EEG to determine if increase is necessary. continue Keppra 1000 milligram b.i.d.

## 2017-07-28 NOTE — PROGRESS NOTE ADULT - SUBJECTIVE AND OBJECTIVE BOX
Vital Signs Last 24 Hrs  T(C): 36.7 (28 Jul 2017 14:41), Max: 37.4 (28 Jul 2017 05:09)  T(F): 98.1 (28 Jul 2017 14:41), Max: 99.3 (28 Jul 2017 05:09)  HR: 82 (28 Jul 2017 14:41) (76 - 104)  BP: 137/73 (28 Jul 2017 14:41) (135/73 - 170/93)  BP(mean): --  RR: 18 (28 Jul 2017 14:41) (18 - 18)  SpO2: 97% (28 Jul 2017 14:41) (97% - 98%)  Seen and examined. More alert today.       MEDICATIONS  (STANDING):  atorvastatin 40 milliGRAM(s) Oral at bedtime  pantoprazole   Suspension 40 milliGRAM(s) Oral before breakfast  levETIRAcetam 750 milliGRAM(s) Oral two times a day      REVIEW OF SYSTEMS: No chest pain, shortness of breath, nausea, vomiting or diarhea.        Independent PTA     CURRENT FUNCTIONAL STATUS: min assist       ----------------------------------------------------------------------------------------  PHYSICAL EXAM  Constitutional - NAD, Comfortable  HEENT - NCAT, EOMI  Neck - Supple, No limited ROM  Chest - CTA bilaterally, No wheeze, No rhonchi, No crackles  Cardiovascular - RRR, S1S2, No murmurs  Abdomen - BS+, Soft, NTND  Extremities - No C/C/E, No calf tenderness. + echymosis b/l UE   Neurologic Exam -                    Cognitive - Awake, Alert, AAO to self, place, date, year, situation     Communication - Fluent, No dysarthria, no aphasia     Cranial Nerves - CN 2-12 intact     Motor - LUE 5-/5, LLE 4/5, RUE/RLE 5/5                       Sensory - Intact to LT     Reflexes - DTR Intact, No primitive reflexive     Balance - WNL Static, poor standing balance   Psychiatric - Mood stable, Affect WNL

## 2017-07-28 NOTE — EEG REPORT - NS EEG TEXT BOX
7/28/2017    Hx: concern for seizures      Study Interpretation:    FINDINGS:  The background was continuous, spontaneously variable and reactive.  The fastest prevalent posterior dominant rhythm during poorly sustained wakefulness was 6-7 Hz activity, with an amplitude to 40 uV, that attenuated to eye opening.  The background mainly consisted of diffuse irregular theta and polymorphic delta activity with intermixed faster frequencies.    Sleep Background:  Drowsiness was characterized by fragmentation, attenuation, and slowing of the background activity.    Segments of stage II sleep were not recorded.    Other Paroxysmal Non-Epileptiform Findings:    None.    Epileptiform Activity:   No epileptiform discharges were present.    Events:  No clinical events were recorded.  No seizures were recorded.    Activation Procedures:   -Hyperventilation was not performed.    -Photic stimulation was not performed.    Artifacts:  Intermittent myogenic and movement artifacts were noted.    Compressed Spectral Array Digital Analysis    FINDINGS:  Compressed Spectral Array (CSA) data was reviewed separately and correlated with the electroencephalographic findings detailed above.  CSA showed a variable spectral pattern.  Areas of increased power in particular were reviewed in detail, and compared with the raw EEG data.  Areas of abrupt increases in spectral power were reviewed to exclude seizures, and were determined to be artifactual in nature.    The relative ratio of the power of delta range frequencies and faster frequencies remained stable over the course of the study.  There was no definitive increase in the relative power in the delta frequency spectrum apparent in the left hemisphere versus the right hemisphere.      Compressed Spectral Array (Digital Analysis) Summary/ Impression:  No persistent hemispheric asymmetry.  Intermittent areas of increased power reviewed, without definite epileptiform activity associated on CSA.      EEG Classification:  Abnormal study  - moderate diffuse slowing    Impression:  Findings indicate non-specific moderate diffuse or multifocal cerebral dysfunction. There were no epileptiform abnormalities recorded, which does not exclude the diagnosis of epilepsy.  Consider repeat study if clinically indicated.

## 2017-07-29 DIAGNOSIS — R63.8 OTHER SYMPTOMS AND SIGNS CONCERNING FOOD AND FLUID INTAKE: ICD-10-CM

## 2017-07-29 LAB
BACTERIA UR CULT: SIGNIFICANT CHANGE UP
BASOPHILS # BLD AUTO: 0.02 K/UL — SIGNIFICANT CHANGE UP (ref 0–0.2)
BASOPHILS NFR BLD AUTO: 0.3 % — SIGNIFICANT CHANGE UP (ref 0–2)
BUN SERPL-MCNC: 32 MG/DL — HIGH (ref 7–23)
CALCIUM SERPL-MCNC: 8.5 MG/DL — SIGNIFICANT CHANGE UP (ref 8.4–10.5)
CHLORIDE SERPL-SCNC: 109 MMOL/L — HIGH (ref 98–107)
CO2 SERPL-SCNC: 17 MMOL/L — LOW (ref 22–31)
CREAT SERPL-MCNC: 2.02 MG/DL — HIGH (ref 0.5–1.3)
EOSINOPHIL # BLD AUTO: 0.28 K/UL — SIGNIFICANT CHANGE UP (ref 0–0.5)
EOSINOPHIL NFR BLD AUTO: 4.6 % — SIGNIFICANT CHANGE UP (ref 0–6)
GLUCOSE SERPL-MCNC: 120 MG/DL — HIGH (ref 70–99)
HCT VFR BLD CALC: 34.1 % — LOW (ref 39–50)
HGB BLD-MCNC: 11.6 G/DL — LOW (ref 13–17)
IMM GRANULOCYTES # BLD AUTO: 0.02 # — SIGNIFICANT CHANGE UP
IMM GRANULOCYTES NFR BLD AUTO: 0.3 % — SIGNIFICANT CHANGE UP (ref 0–1.5)
LYMPHOCYTES # BLD AUTO: 1.14 K/UL — SIGNIFICANT CHANGE UP (ref 1–3.3)
LYMPHOCYTES # BLD AUTO: 18.7 % — SIGNIFICANT CHANGE UP (ref 13–44)
MAGNESIUM SERPL-MCNC: 1.8 MG/DL — SIGNIFICANT CHANGE UP (ref 1.6–2.6)
MCHC RBC-ENTMCNC: 33.4 PG — SIGNIFICANT CHANGE UP (ref 27–34)
MCHC RBC-ENTMCNC: 34 % — SIGNIFICANT CHANGE UP (ref 32–36)
MCV RBC AUTO: 98.3 FL — SIGNIFICANT CHANGE UP (ref 80–100)
MONOCYTES # BLD AUTO: 0.72 K/UL — SIGNIFICANT CHANGE UP (ref 0–0.9)
MONOCYTES NFR BLD AUTO: 11.8 % — SIGNIFICANT CHANGE UP (ref 2–14)
NEUTROPHILS # BLD AUTO: 3.92 K/UL — SIGNIFICANT CHANGE UP (ref 1.8–7.4)
NEUTROPHILS NFR BLD AUTO: 64.3 % — SIGNIFICANT CHANGE UP (ref 43–77)
NRBC # FLD: 0 — SIGNIFICANT CHANGE UP
PLATELET # BLD AUTO: 184 K/UL — SIGNIFICANT CHANGE UP (ref 150–400)
PMV BLD: 9.5 FL — SIGNIFICANT CHANGE UP (ref 7–13)
POTASSIUM SERPL-MCNC: 4.5 MMOL/L — SIGNIFICANT CHANGE UP (ref 3.5–5.3)
POTASSIUM SERPL-SCNC: 4.5 MMOL/L — SIGNIFICANT CHANGE UP (ref 3.5–5.3)
RBC # BLD: 3.47 M/UL — LOW (ref 4.2–5.8)
RBC # FLD: 12.9 % — SIGNIFICANT CHANGE UP (ref 10.3–14.5)
SODIUM SERPL-SCNC: 139 MMOL/L — SIGNIFICANT CHANGE UP (ref 135–145)
SPECIMEN SOURCE: SIGNIFICANT CHANGE UP
WBC # BLD: 6.1 K/UL — SIGNIFICANT CHANGE UP (ref 3.8–10.5)
WBC # FLD AUTO: 6.1 K/UL — SIGNIFICANT CHANGE UP (ref 3.8–10.5)

## 2017-07-29 PROCEDURE — 99233 SBSQ HOSP IP/OBS HIGH 50: CPT

## 2017-07-29 RX ORDER — LANOLIN ALCOHOL/MO/W.PET/CERES
3 CREAM (GRAM) TOPICAL AT BEDTIME
Qty: 0 | Refills: 0 | Status: COMPLETED | OUTPATIENT
Start: 2017-07-29 | End: 2017-07-29

## 2017-07-29 RX ORDER — ACETAMINOPHEN 500 MG
650 TABLET ORAL ONCE
Qty: 0 | Refills: 0 | Status: COMPLETED | OUTPATIENT
Start: 2017-07-29 | End: 2017-07-29

## 2017-07-29 RX ADMIN — ATORVASTATIN CALCIUM 40 MILLIGRAM(S): 80 TABLET, FILM COATED ORAL at 21:20

## 2017-07-29 RX ADMIN — Medication 3 MILLIGRAM(S): at 21:21

## 2017-07-29 RX ADMIN — PANTOPRAZOLE SODIUM 40 MILLIGRAM(S): 20 TABLET, DELAYED RELEASE ORAL at 05:10

## 2017-07-29 RX ADMIN — LEVETIRACETAM 750 MILLIGRAM(S): 250 TABLET, FILM COATED ORAL at 05:10

## 2017-07-29 RX ADMIN — LEVETIRACETAM 750 MILLIGRAM(S): 250 TABLET, FILM COATED ORAL at 18:02

## 2017-07-29 RX ADMIN — Medication 650 MILLIGRAM(S): at 05:54

## 2017-07-29 RX ADMIN — AMLODIPINE BESYLATE 10 MILLIGRAM(S): 2.5 TABLET ORAL at 05:10

## 2017-07-29 RX ADMIN — Medication 650 MILLIGRAM(S): at 06:50

## 2017-07-29 NOTE — PROGRESS NOTE ADULT - PROBLEM SELECTOR PLAN 2
-continue with Keppra at 1000 mg BID: can switch to PO now that he is tolerating diet  -routine EEG no epileptic activity, mod diffuse slowing   Attempted to call Dr. Bello (outpatient neurologist to update and obtain baseline Cr): was not able to reach at [phone]

## 2017-07-29 NOTE — PROGRESS NOTE ADULT - PROBLEM SELECTOR PLAN 1
Repeat head imaging shows stable hemorrhages (MRI and CT).   BP satisfactory, c/w keeping SBP <140, regular neurochecks     TTE pending   Neuro recs appreciated, evaluated by neurosurgery

## 2017-07-29 NOTE — PROGRESS NOTE ADULT - SUBJECTIVE AND OBJECTIVE BOX
Progress Note - Internal Medicine ADS    S: Did not sleep well last night 2/2 left great toe pain. He recalls stubbing his toe while in the hospital. Currently it does not hurt anymore. He has no other complaints    O:  Vital Signs Last 24 Hrs  T(C): 36.4 (29 Jul 2017 12:28), Max: 37.2 (29 Jul 2017 05:08)  T(F): 97.5 (29 Jul 2017 12:28), Max: 98.9 (29 Jul 2017 05:08)  HR: 94 (29 Jul 2017 12:28) (83 - 94)  BP: 127/81 (29 Jul 2017 12:28) (127/81 - 156/88)  BP(mean): --  RR: 17 (29 Jul 2017 12:28) (17 - 18)  SpO2: 95% (29 Jul 2017 12:28) (95% - 97%)    No telemetry events today     General: NAD, elderly, non-toxic appearing, speaking in full sentences   HEENT: EOMI, PERRLA, no conjunctival pallor, MMM, no JVD, no thyromegaly, neck supple, trachea midline  CV: S1S2 RRR no MRG  Lungs: CTA BL  Abdomen: soft NTND +BS   Extremities No CCE +WWP, toes appear dry skin with yellowing nails however no ulcers or bruises (b/l)   Skin/MSK: No rashes, preserved ROM on active & passive movement  Neuro: AAOx3, no focal deficits (5/5 strength all extremities) no sensory deficits                           11.6   6.10  )-----------( 184      ( 29 Jul 2017 04:52 )             34.1     07-29    139  |  109<H>  |  32<H>  ----------------------------<  120<H>  4.5   |  17<L>  |  2.02<H>    Ca    8.5      29 Jul 2017 04:52  Mg     1.8     07-29

## 2017-07-29 NOTE — PROGRESS NOTE ADULT - ASSESSMENT
89M hx of HTN, BPH, CAD presents with sudden-onset of left focal UE seizure and left sided facial droop and weakness found to have a R frontal IPH. S/p MICU transfer

## 2017-07-29 NOTE — PROGRESS NOTE ADULT - PROBLEM SELECTOR PLAN 3
Unknown baseline creatinine, increased to 2.02 today. Will order Renal US and avoid nephrotoxic drugs at this time

## 2017-07-30 DIAGNOSIS — N39.0 URINARY TRACT INFECTION, SITE NOT SPECIFIED: ICD-10-CM

## 2017-07-30 LAB
APPEARANCE UR: SIGNIFICANT CHANGE UP
BASOPHILS # BLD AUTO: 0.02 K/UL — SIGNIFICANT CHANGE UP (ref 0–0.2)
BASOPHILS NFR BLD AUTO: 0.3 % — SIGNIFICANT CHANGE UP (ref 0–2)
BILIRUB UR-MCNC: NEGATIVE — SIGNIFICANT CHANGE UP
BLOOD UR QL VISUAL: HIGH
BUN SERPL-MCNC: 36 MG/DL — HIGH (ref 7–23)
BUN SERPL-MCNC: 37 MG/DL — HIGH (ref 7–23)
CALCIUM SERPL-MCNC: 8.6 MG/DL — SIGNIFICANT CHANGE UP (ref 8.4–10.5)
CALCIUM SERPL-MCNC: 8.7 MG/DL — SIGNIFICANT CHANGE UP (ref 8.4–10.5)
CHLORIDE SERPL-SCNC: 108 MMOL/L — HIGH (ref 98–107)
CHLORIDE SERPL-SCNC: 109 MMOL/L — HIGH (ref 98–107)
CO2 SERPL-SCNC: 18 MMOL/L — LOW (ref 22–31)
CO2 SERPL-SCNC: 18 MMOL/L — LOW (ref 22–31)
COLOR SPEC: YELLOW — SIGNIFICANT CHANGE UP
CREAT ?TM UR-MCNC: 95.26 MG/DL — SIGNIFICANT CHANGE UP
CREAT SERPL-MCNC: 2.23 MG/DL — HIGH (ref 0.5–1.3)
CREAT SERPL-MCNC: 2.29 MG/DL — HIGH (ref 0.5–1.3)
EOSINOPHIL # BLD AUTO: 0.36 K/UL — SIGNIFICANT CHANGE UP (ref 0–0.5)
EOSINOPHIL NFR BLD AUTO: 5.7 % — SIGNIFICANT CHANGE UP (ref 0–6)
GLUCOSE SERPL-MCNC: 129 MG/DL — HIGH (ref 70–99)
GLUCOSE SERPL-MCNC: 193 MG/DL — HIGH (ref 70–99)
GLUCOSE UR-MCNC: 150 — SIGNIFICANT CHANGE UP
HCT VFR BLD CALC: 30.7 % — LOW (ref 39–50)
HGB BLD-MCNC: 10.7 G/DL — LOW (ref 13–17)
IMM GRANULOCYTES # BLD AUTO: 0.02 # — SIGNIFICANT CHANGE UP
IMM GRANULOCYTES NFR BLD AUTO: 0.3 % — SIGNIFICANT CHANGE UP (ref 0–1.5)
KETONES UR-MCNC: NEGATIVE — SIGNIFICANT CHANGE UP
LEUKOCYTE ESTERASE UR-ACNC: HIGH
LYMPHOCYTES # BLD AUTO: 1.54 K/UL — SIGNIFICANT CHANGE UP (ref 1–3.3)
LYMPHOCYTES # BLD AUTO: 24.2 % — SIGNIFICANT CHANGE UP (ref 13–44)
MAGNESIUM SERPL-MCNC: 1.9 MG/DL — SIGNIFICANT CHANGE UP (ref 1.6–2.6)
MCHC RBC-ENTMCNC: 33.6 PG — SIGNIFICANT CHANGE UP (ref 27–34)
MCHC RBC-ENTMCNC: 34.9 % — SIGNIFICANT CHANGE UP (ref 32–36)
MCV RBC AUTO: 96.5 FL — SIGNIFICANT CHANGE UP (ref 80–100)
MONOCYTES # BLD AUTO: 0.73 K/UL — SIGNIFICANT CHANGE UP (ref 0–0.9)
MONOCYTES NFR BLD AUTO: 11.5 % — SIGNIFICANT CHANGE UP (ref 2–14)
MUCOUS THREADS # UR AUTO: SIGNIFICANT CHANGE UP
NEUTROPHILS # BLD AUTO: 3.69 K/UL — SIGNIFICANT CHANGE UP (ref 1.8–7.4)
NEUTROPHILS NFR BLD AUTO: 58 % — SIGNIFICANT CHANGE UP (ref 43–77)
NITRITE UR-MCNC: NEGATIVE — SIGNIFICANT CHANGE UP
NRBC # FLD: 0 — SIGNIFICANT CHANGE UP
OSMOLALITY UR: 515 MOSMO/KG — SIGNIFICANT CHANGE UP (ref 50–1200)
PH UR: 6 — SIGNIFICANT CHANGE UP (ref 4.6–8)
PLATELET # BLD AUTO: 194 K/UL — SIGNIFICANT CHANGE UP (ref 150–400)
PMV BLD: 10.1 FL — SIGNIFICANT CHANGE UP (ref 7–13)
POTASSIUM SERPL-MCNC: 4.4 MMOL/L — SIGNIFICANT CHANGE UP (ref 3.5–5.3)
POTASSIUM SERPL-MCNC: 5 MMOL/L — SIGNIFICANT CHANGE UP (ref 3.5–5.3)
POTASSIUM SERPL-SCNC: 4.4 MMOL/L — SIGNIFICANT CHANGE UP (ref 3.5–5.3)
POTASSIUM SERPL-SCNC: 5 MMOL/L — SIGNIFICANT CHANGE UP (ref 3.5–5.3)
PROT UR-MCNC: 500 — HIGH
RBC # BLD: 3.18 M/UL — LOW (ref 4.2–5.8)
RBC # FLD: 12.7 % — SIGNIFICANT CHANGE UP (ref 10.3–14.5)
RBC CASTS # UR COMP ASSIST: SIGNIFICANT CHANGE UP (ref 0–?)
SODIUM SERPL-SCNC: 141 MMOL/L — SIGNIFICANT CHANGE UP (ref 135–145)
SODIUM SERPL-SCNC: 142 MMOL/L — SIGNIFICANT CHANGE UP (ref 135–145)
SODIUM UR-SCNC: 76 MEQ/L — SIGNIFICANT CHANGE UP
SP GR SPEC: 1.02 — SIGNIFICANT CHANGE UP (ref 1–1.03)
SQUAMOUS # UR AUTO: SIGNIFICANT CHANGE UP
UROBILINOGEN FLD QL: NORMAL E.U. — SIGNIFICANT CHANGE UP (ref 0.1–0.2)
WBC # BLD: 6.36 K/UL — SIGNIFICANT CHANGE UP (ref 3.8–10.5)
WBC # FLD AUTO: 6.36 K/UL — SIGNIFICANT CHANGE UP (ref 3.8–10.5)
WBC UR QL: HIGH (ref 0–?)

## 2017-07-30 PROCEDURE — 99233 SBSQ HOSP IP/OBS HIGH 50: CPT

## 2017-07-30 RX ORDER — AMOXICILLIN 250 MG/5ML
500 SUSPENSION, RECONSTITUTED, ORAL (ML) ORAL EVERY 12 HOURS
Qty: 0 | Refills: 0 | Status: DISCONTINUED | OUTPATIENT
Start: 2017-07-30 | End: 2017-08-01

## 2017-07-30 RX ORDER — LEVETIRACETAM 250 MG/1
750 TABLET, FILM COATED ORAL
Qty: 0 | Refills: 0 | Status: DISCONTINUED | OUTPATIENT
Start: 2017-07-30 | End: 2017-08-01

## 2017-07-30 RX ORDER — LANOLIN ALCOHOL/MO/W.PET/CERES
3 CREAM (GRAM) TOPICAL AT BEDTIME
Qty: 0 | Refills: 0 | Status: DISCONTINUED | OUTPATIENT
Start: 2017-07-30 | End: 2017-08-01

## 2017-07-30 RX ORDER — SODIUM CHLORIDE 9 MG/ML
500 INJECTION, SOLUTION INTRAVENOUS
Qty: 0 | Refills: 0 | Status: COMPLETED | OUTPATIENT
Start: 2017-07-30 | End: 2017-07-30

## 2017-07-30 RX ORDER — LEVETIRACETAM 250 MG/1
1000 TABLET, FILM COATED ORAL
Qty: 0 | Refills: 0 | Status: DISCONTINUED | OUTPATIENT
Start: 2017-07-30 | End: 2017-07-30

## 2017-07-30 RX ADMIN — LEVETIRACETAM 750 MILLIGRAM(S): 250 TABLET, FILM COATED ORAL at 05:44

## 2017-07-30 RX ADMIN — Medication 500 MILLIGRAM(S): at 17:58

## 2017-07-30 RX ADMIN — ATORVASTATIN CALCIUM 40 MILLIGRAM(S): 80 TABLET, FILM COATED ORAL at 21:18

## 2017-07-30 RX ADMIN — SODIUM CHLORIDE 75 MILLILITER(S): 9 INJECTION, SOLUTION INTRAVENOUS at 12:34

## 2017-07-30 RX ADMIN — Medication 3 MILLIGRAM(S): at 21:18

## 2017-07-30 RX ADMIN — LEVETIRACETAM 750 MILLIGRAM(S): 250 TABLET, FILM COATED ORAL at 17:58

## 2017-07-30 RX ADMIN — Medication 500 MILLIGRAM(S): at 12:33

## 2017-07-30 RX ADMIN — AMLODIPINE BESYLATE 10 MILLIGRAM(S): 2.5 TABLET ORAL at 05:44

## 2017-07-30 RX ADMIN — PANTOPRAZOLE SODIUM 40 MILLIGRAM(S): 20 TABLET, DELAYED RELEASE ORAL at 05:44

## 2017-07-30 NOTE — PROGRESS NOTE ADULT - SUBJECTIVE AND OBJECTIVE BOX
Progress Note - Internal Medicine ADS    S: slept well no complaints    REVIEW OF SYSTEMS:    CONSTITUTIONAL: No weakness, fevers or chills  EYES/ENT: No visual changes;  No vertigo or throat pain   NECK: No pain or stiffness  RESPIRATORY: No cough, wheezing, hemoptysis; No shortness of breath  CARDIOVASCULAR: No chest pain or palpitations  GASTROINTESTINAL: No abdominal or epigastric pain. No nausea, vomiting, or hematemesis; No diarrhea or constipation. No melena or hematochezia.  GENITOURINARY: No dysuria, frequency or hematuria  NEUROLOGICAL: No numbness or weakness  SKIN: No itching, burning, rashes, or lesions   All other review of systems is negative unless indicated above.    O:  Vital Signs Last 24 Hrs  T(C): 37 (30 Jul 2017 05:41), Max: 37 (30 Jul 2017 05:41)  T(F): 98.6 (30 Jul 2017 05:41), Max: 98.6 (30 Jul 2017 05:41)  HR: 85 (30 Jul 2017 05:41) (85 - 94)  BP: 122/65 (30 Jul 2017 05:41) (122/65 - 147/87)  BP(mean): --  RR: 18 (30 Jul 2017 05:41) (17 - 18)  SpO2: 96% (30 Jul 2017 05:41) (95% - 98%)    General: NAD, elderly, non-toxic appearing, speaking in full sentences   HEENT: EOMI, PERRLA, no conjunctival pallor, MMM, no JVD, no thyromegaly, neck supple, trachea midline  CV: S1S2 RRR no MRG  Lungs: CTA BL  Abdomen: soft NTND +BS   Extremeties: No CCE +WWP  Skin/MSK: No rashes, preserved ROM on active & passive movement  Neuro: AAOx3, no focal deficits (5/5 strength all extremities no sensory deficits Progress Note - Internal Medicine ADS    S: slept well no complaints    REVIEW OF SYSTEMS:    CONSTITUTIONAL: No weakness, fevers or chills  EYES/ENT: No visual changes;  No vertigo or throat pain   NECK: No pain or stiffness  RESPIRATORY: No cough, wheezing, hemoptysis; No shortness of breath  CARDIOVASCULAR: No chest pain or palpitations  GASTROINTESTINAL: No abdominal or epigastric pain. No nausea, vomiting, or hematemesis; No diarrhea or constipation. No melena or hematochezia.  GENITOURINARY: No dysuria, frequency or hematuria  NEUROLOGICAL: No numbness or weakness  SKIN: No itching, burning, rashes, or lesions   All other review of systems is negative unless indicated above.    O:  Vital Signs Last 24 Hrs  T(C): 37 (30 Jul 2017 05:41), Max: 37 (30 Jul 2017 05:41)  T(F): 98.6 (30 Jul 2017 05:41), Max: 98.6 (30 Jul 2017 05:41)  HR: 85 (30 Jul 2017 05:41) (85 - 94)  BP: 122/65 (30 Jul 2017 05:41) (122/65 - 147/87)  BP(mean): --  RR: 18 (30 Jul 2017 05:41) (17 - 18)  SpO2: 96% (30 Jul 2017 05:41) (95% - 98%)    General: NAD, elderly, non-toxic appearing, speaking in full sentences   HEENT: EOMI, PERRLA, no conjunctival pallor, MMM, no JVD, no thyromegaly, neck supple, trachea midline  CV: S1S2 RRR no MRG  Lungs: CTA BL  Abdomen: soft NTND +BS   Extremeties: No CCE +WWP  Skin/MSK: No rashes, preserved ROM on active & passive movement  Neuro: AAOx3, no focal deficits (5/5 strength all extremities no sensory deficits                          10.7   6.36  )-----------( 194      ( 30 Jul 2017 07:06 )             30.7     07-30    142  |  109<H>  |  36<H>  ----------------------------<  129<H>  5.0   |  18<L>  |  2.23<H>    Ca    8.7      30 Jul 2017 07:06  Mg     1.9     07-30

## 2017-07-30 NOTE — PROGRESS NOTE ADULT - PROBLEM SELECTOR PLAN 7
No DVT ppx indicated at this time given ICH Patient with UTI + Aerococcus Urinae. This organism is a pathogenic cause of UTI that commonly effects elderly and is susceptible to Amoxicillin. Patient not complaining of symptoms, but given increasing creatinine will empirically treat with Amoxicllin 500 mg PO q12h for 10 days.

## 2017-07-30 NOTE — PROGRESS NOTE ADULT - PROBLEM SELECTOR PLAN 2
- c/w Keppra 1000 mg PO BID  -routine EEG no epileptic activity, mod diffuse slowing - decreased Keppra to 750 mg PO BID for renal dosing   -routine EEG no epileptic activity, mod diffuse slowing

## 2017-07-30 NOTE — PROGRESS NOTE ADULT - PROBLEM SELECTOR PLAN 3
Unknown baseline creatinine, increased to 2.02 today. Will order Renal US and avoid nephrotoxic drugs at this time Unknown baseline creatinine, increased to 2.23 today w/ BUN of 36. Will order Renal US, UA, Urine electrolytes, and avoid nephrotoxic drugs at this time. Will give 500 cc fluid challenge of LR (has concomitant hyperchloremia and decreased bicarbonate, will refrain from 0.9% NS) Unknown baseline creatinine, increased to 2.23 today w/ BUN of 36. Will order Renal UA, Urine electrolytes, and avoid nephrotoxic drugs at this time. Will give 500 cc fluid challenge of LR (has concomitant hyperchloremia and decreased bicarbonate, will refrain from 0.9% NS)

## 2017-07-30 NOTE — PROGRESS NOTE ADULT - PROBLEM SELECTOR PLAN 8
As per speech and swallow: Regular diet with thin liquids  Pending rehab placement   No IVF for now As per speech and swallow: Regular diet with thin liquids  Pending rehab placement No DVT ppx indicated at this time given ICH

## 2017-07-31 LAB
BASOPHILS # BLD AUTO: 0.04 K/UL — SIGNIFICANT CHANGE UP (ref 0–0.2)
BASOPHILS NFR BLD AUTO: 0.6 % — SIGNIFICANT CHANGE UP (ref 0–2)
BUN SERPL-MCNC: 35 MG/DL — HIGH (ref 7–23)
CALCIUM SERPL-MCNC: 8.8 MG/DL — SIGNIFICANT CHANGE UP (ref 8.4–10.5)
CHLORIDE SERPL-SCNC: 110 MMOL/L — HIGH (ref 98–107)
CO2 SERPL-SCNC: 16 MMOL/L — LOW (ref 22–31)
CREAT SERPL-MCNC: 2.27 MG/DL — HIGH (ref 0.5–1.3)
EOSINOPHIL # BLD AUTO: 0.37 K/UL — SIGNIFICANT CHANGE UP (ref 0–0.5)
EOSINOPHIL NFR BLD AUTO: 5.9 % — SIGNIFICANT CHANGE UP (ref 0–6)
GLUCOSE SERPL-MCNC: 118 MG/DL — HIGH (ref 70–99)
HCT VFR BLD CALC: 33.5 % — LOW (ref 39–50)
HGB BLD-MCNC: 11.4 G/DL — LOW (ref 13–17)
IMM GRANULOCYTES # BLD AUTO: 0.04 # — SIGNIFICANT CHANGE UP
IMM GRANULOCYTES NFR BLD AUTO: 0.6 % — SIGNIFICANT CHANGE UP (ref 0–1.5)
LYMPHOCYTES # BLD AUTO: 1.03 K/UL — SIGNIFICANT CHANGE UP (ref 1–3.3)
LYMPHOCYTES # BLD AUTO: 16.5 % — SIGNIFICANT CHANGE UP (ref 13–44)
MAGNESIUM SERPL-MCNC: 1.8 MG/DL — SIGNIFICANT CHANGE UP (ref 1.6–2.6)
MCHC RBC-ENTMCNC: 32.9 PG — SIGNIFICANT CHANGE UP (ref 27–34)
MCHC RBC-ENTMCNC: 34 % — SIGNIFICANT CHANGE UP (ref 32–36)
MCV RBC AUTO: 96.5 FL — SIGNIFICANT CHANGE UP (ref 80–100)
MONOCYTES # BLD AUTO: 0.79 K/UL — SIGNIFICANT CHANGE UP (ref 0–0.9)
MONOCYTES NFR BLD AUTO: 12.6 % — SIGNIFICANT CHANGE UP (ref 2–14)
NEUTROPHILS # BLD AUTO: 3.99 K/UL — SIGNIFICANT CHANGE UP (ref 1.8–7.4)
NEUTROPHILS NFR BLD AUTO: 63.8 % — SIGNIFICANT CHANGE UP (ref 43–77)
NRBC # FLD: 0 — SIGNIFICANT CHANGE UP
PLATELET # BLD AUTO: 185 K/UL — SIGNIFICANT CHANGE UP (ref 150–400)
PMV BLD: 9.5 FL — SIGNIFICANT CHANGE UP (ref 7–13)
POTASSIUM SERPL-MCNC: 4.8 MMOL/L — SIGNIFICANT CHANGE UP (ref 3.5–5.3)
POTASSIUM SERPL-SCNC: 4.8 MMOL/L — SIGNIFICANT CHANGE UP (ref 3.5–5.3)
RBC # BLD: 3.47 M/UL — LOW (ref 4.2–5.8)
RBC # FLD: 12.8 % — SIGNIFICANT CHANGE UP (ref 10.3–14.5)
SODIUM SERPL-SCNC: 142 MMOL/L — SIGNIFICANT CHANGE UP (ref 135–145)
WBC # BLD: 6.26 K/UL — SIGNIFICANT CHANGE UP (ref 3.8–10.5)
WBC # FLD AUTO: 6.26 K/UL — SIGNIFICANT CHANGE UP (ref 3.8–10.5)

## 2017-07-31 PROCEDURE — 99233 SBSQ HOSP IP/OBS HIGH 50: CPT

## 2017-07-31 PROCEDURE — 99233 SBSQ HOSP IP/OBS HIGH 50: CPT | Mod: GC

## 2017-07-31 PROCEDURE — 93306 TTE W/DOPPLER COMPLETE: CPT | Mod: 26

## 2017-07-31 PROCEDURE — 76775 US EXAM ABDO BACK WALL LIM: CPT | Mod: 26

## 2017-07-31 RX ORDER — AMOXICILLIN 250 MG/5ML
1 SUSPENSION, RECONSTITUTED, ORAL (ML) ORAL
Qty: 0 | Refills: 0 | COMMUNITY
Start: 2017-07-31 | End: 2017-08-09

## 2017-07-31 RX ORDER — AMLODIPINE BESYLATE 2.5 MG/1
1 TABLET ORAL
Qty: 0 | Refills: 0 | COMMUNITY
Start: 2017-07-31

## 2017-07-31 RX ORDER — LEVETIRACETAM 250 MG/1
1 TABLET, FILM COATED ORAL
Qty: 0 | Refills: 0 | COMMUNITY
Start: 2017-07-31

## 2017-07-31 RX ORDER — LANOLIN ALCOHOL/MO/W.PET/CERES
1 CREAM (GRAM) TOPICAL
Qty: 0 | Refills: 0 | COMMUNITY
Start: 2017-07-31

## 2017-07-31 RX ORDER — AMOXICILLIN 250 MG/5ML
1 SUSPENSION, RECONSTITUTED, ORAL (ML) ORAL
Qty: 0 | Refills: 0 | COMMUNITY
Start: 2017-07-31 | End: 2017-08-05

## 2017-07-31 RX ADMIN — Medication 500 MILLIGRAM(S): at 07:01

## 2017-07-31 RX ADMIN — Medication 500 MILLIGRAM(S): at 17:12

## 2017-07-31 RX ADMIN — Medication 3 MILLIGRAM(S): at 21:50

## 2017-07-31 RX ADMIN — LEVETIRACETAM 750 MILLIGRAM(S): 250 TABLET, FILM COATED ORAL at 06:41

## 2017-07-31 RX ADMIN — PANTOPRAZOLE SODIUM 40 MILLIGRAM(S): 20 TABLET, DELAYED RELEASE ORAL at 06:41

## 2017-07-31 RX ADMIN — LEVETIRACETAM 750 MILLIGRAM(S): 250 TABLET, FILM COATED ORAL at 17:13

## 2017-07-31 RX ADMIN — Medication 30 MILLILITER(S): at 21:50

## 2017-07-31 RX ADMIN — AMLODIPINE BESYLATE 10 MILLIGRAM(S): 2.5 TABLET ORAL at 06:41

## 2017-07-31 RX ADMIN — ATORVASTATIN CALCIUM 40 MILLIGRAM(S): 80 TABLET, FILM COATED ORAL at 21:50

## 2017-07-31 NOTE — PROGRESS NOTE ADULT - PROBLEM SELECTOR PLAN 1
Repeat head imaging shows stable hemorrhages (MRI and CT).   BP satisfactory, c/w keeping SBP <140, regular neurochecks   DC to  rehab center  when bed available   TTE noted, grossly normal LV and RV fn   Neuro recs appreciated,   recommend Follow up with vascular neurology Dr. Segura or Dr. Sargent in 2-4 weeks  pt will need repeat  MRI  and EEG in 3-6 months   - Cleared for discharge from neuro standpoint

## 2017-07-31 NOTE — PROGRESS NOTE ADULT - PROBLEM SELECTOR PLAN 3
likely chronic  Renal USG noted, Parenchymal renal disease. Atrophic parenchyma.  renal function stable

## 2017-07-31 NOTE — PROGRESS NOTE ADULT - PROBLEM SELECTOR PLAN 2
- c/w  Keppra to 750 mg PO BID for renal dosing   -routine EEG no epileptic activity, mod diffuse slowing  -outpt neuro f/u

## 2017-07-31 NOTE — PROGRESS NOTE ADULT - PROBLEM SELECTOR PLAN 7
Patient with UTI + Aerococcus Urinae. This organism is a pathogenic cause of UTI that commonly effects elderly and is susceptible to Amoxicillin. Patient not complaining of symptoms, but given increasing creatinine will empirically treat with Amoxicllin 500 mg PO q12h for 10 days.

## 2017-07-31 NOTE — PROGRESS NOTE ADULT - ASSESSMENT
89 yr old male with  hx of HTN, BPH, CAD presented  with sudden-onset of left focal UE seizure and left sided facial droop and weakness found to have a R frontal IPH. S/p MICU transfer

## 2017-07-31 NOTE — PROGRESS NOTE ADULT - SUBJECTIVE AND OBJECTIVE BOX
Patient is a 89y old  Male who presents with a chief complaint of Seizure, Subarachnoid Hemorrhage (2017 14:47)      SUBJECTIVE / OVERNIGHT EVENTS: patient seen and examined by bedside, no acute distress noted, denies headache , dizziness         MEDICATIONS  (STANDING):  atorvastatin 40 milliGRAM(s) Oral at bedtime  pantoprazole   Suspension 40 milliGRAM(s) Oral before breakfast  amLODIPine   Tablet 10 milliGRAM(s) Oral daily  levETIRAcetam 750 milliGRAM(s) Oral two times a day  amoxicillin      Capsule 500 milliGRAM(s) Oral every 12 hours    MEDICATIONS  (PRN):  aluminum hydroxide/magnesium hydroxide/simethicone Suspension 30 milliLiter(s) Oral every 4 hours PRN Dyspepsia  hydrALAZINE Injectable 10 milliGRAM(s) IV Push every 6 hours PRN SBP >150  melatonin 3 milliGRAM(s) Oral at bedtime PRN Insomnia      Vital Signs Last 24 Hrs  T(C): 36.7 (2017 13:06), Max: 36.9 (2017 17:57)  T(F): 98 (2017 13:06), Max: 98.4 (2017 17:57)  HR: 88 (2017 13:06) (84 - 88)  BP: 130/73 (2017 13:06) (122/65 - 146/69)  BP(mean): --  RR: 18 (2017 13:06) (18 - 18)  SpO2: 99% (2017 13:06) (96% - 99%)  CAPILLARY BLOOD GLUCOSE        I&O's Summary      PHYSICAL EXAM:  GENERAL: NAD, well-developed  HEAD:  Atraumatic, Normocephalic  EYES: EOMI, PERRLA, conjunctiva and sclera clear  NECK: Supple, No JVD  CHEST/LUNG: Clear to auscultation bilaterally; No wheeze  HEART: Regular rate and rhythm; No murmurs, rubs, or gallops  ABDOMEN: Soft, Nontender, Nondistended; Bowel sounds present  EXTREMITIES:  2+ Peripheral Pulses, No clubbing, cyanosis, or edema  PSYCH: AAOx3  NEUROLOGY: non-focal, power 5/5 in all extremities       LABS:                        11.4   6.26  )-----------( 185      ( 2017 06:55 )             33.5     07-31    142  |  110<H>  |  35<H>  ----------------------------<  118<H>  4.8   |  16<L>  |  2.27<H>    Ca    8.8      2017 06:55  Mg     1.8                 Urinalysis Basic - ( 2017 14:00 )    Color: YELLOW / Appearance: HAZY / S.016 / pH: 6.0  Gluc: 150 / Ketone: NEGATIVE  / Bili: NEGATIVE / Urobili: NORMAL E.U.   Blood: TRACE / Protein: 500 / Nitrite: NEGATIVE   Leuk Esterase: SMALL / RBC: 2-5 / WBC 5-10   Sq Epi: OCC / Non Sq Epi: x / Bacteria: x        RADIOLOGY & ADDITIONAL TESTS:< from: Transthoracic Echocardiogram (17 @ 07:36) >  Transthoracic echocardiogram with 2-D, M-Mode    < end of copied text >  < from: Transthoracic Echocardiogram (17 @ 07:36) >  ------------------------------------------------------------------------  CONCLUSIONS:  1. Mitral annular calcification, otherwise normal mitral  valve. Minimal mitral regurgitation.  2. Normal left ventricular internal dimensions and wall  thicknesses.  3. Endocardium not well visualized; grossly normal left  ventricular systolic function.  4. The right ventricle is not well visualized; grossly  normal right ventricular systolic function.    < end of copied text >      Imaging Personally Reviewed:      < from: US Renal (17 @ 11:16) >  US KIDNEY(S)        PROCEDURE DATE:  2017         INTERPRETATION:  CLINICAL INFORMATION: 89-year-old man with increased   creatinine.    COMPARISON: None available.    TECHNIQUE: Sonography of the kidneys and bladder.     FINDINGS:    Right kidney:  9.9 cm. No renal mass, hydronephrosis or calculi.    Left kidney:  9.6 cm. No renal mass, hydronephrosis or calculi.    Bilateral renal cysts. Parenchymal renal disease.    Urinary bladder: Within normal limits.    IMPRESSION:     Parenchymal renal disease. Atrophic parenchyma.      < end of copied text >    Consultant(s) Notes Reviewed: Neurology   Care Discussed with Consultants/Other Providers:neurology

## 2017-07-31 NOTE — PROGRESS NOTE ADULT - ASSESSMENT
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    - c/w Keppra , Will need treatment for at least 3-6 months.  - Follow up with vascular neurology Dr. Segura or Dr. Sargent in 2-4 weeks  - Will need Rpt MRI  and EEG in 3-6 months   - Cleared for discharge from neuro standpoint

## 2017-07-31 NOTE — PROGRESS NOTE ADULT - SUBJECTIVE AND OBJECTIVE BOX
Neurology Follow up note    Patient is a 89y old  Male who presents with a chief complaint of Seizure, Subarachnoid Hemorrhage (26 Jul 2017 14:47)      Subjective: Interval History - No events overnight. Pt wants to know if he is going to rehab. No new complaints. No headache.     Objective:   Vital Signs Last 24 Hrs  T(C): 36.7 (31 Jul 2017 13:06), Max: 36.9 (30 Jul 2017 17:57)  T(F): 98 (31 Jul 2017 13:06), Max: 98.4 (30 Jul 2017 17:57)  HR: 88 (31 Jul 2017 13:06) (84 - 88)  BP: 130/73 (31 Jul 2017 13:06) (122/65 - 146/69)  BP(mean): --  RR: 18 (31 Jul 2017 13:06) (18 - 18)  SpO2: 99% (31 Jul 2017 13:06) (96% - 99%)    General Exam:   General appearance: No acute distress                   Neurological Exam:  Mental Status: Orientated to self, date and place.  Attention intact.  No dysarthria, aphasia or neglect.  Knowledge intact.  Registration intact.  Short and long term memory grossly intact.      Cranial Nerves:  PERRL, EOMI, VFF, no nystagmus   CN V1-3 intact to light touch and pinprick.  No facial asymmetry.  Hearing intact to finger rub bilaterally.  Tongue, uvula and palate midline.  Sternocleidomastoid and Trapezius intact bilaterally.    Motor:   Tone: normal.                  Strength: no drift b/l upper ext, 5/5. 4+/5 B/L LE   Pronator drift: none                 Dysmeria: None to finger-nose-finger or heel-shin-heel  Tremor: No resting, postural or action tremor.     Sensation: intact to light touch    Toes flexor bilaterally      Other:    07-31    142  |  110<H>  |  35<H>  ----------------------------<  118<H>  4.8   |  16<L>  |  2.27<H>    Ca    8.8      31 Jul 2017 06:55  Mg     1.8     07-31 07-31    142  |  110<H>  |  35<H>  ----------------------------<  118<H>  4.8   |  16<L>  |  2.27<H>    Ca    8.8      31 Jul 2017 06:55  Mg     1.8     07-31                              11.4   6.26  )-----------( 185      ( 31 Jul 2017 06:55 )             33.5     Radiology        MEDICATIONS  (STANDING):  atorvastatin 40 milliGRAM(s) Oral at bedtime  pantoprazole   Suspension 40 milliGRAM(s) Oral before breakfast  amLODIPine   Tablet 10 milliGRAM(s) Oral daily  levETIRAcetam 750 milliGRAM(s) Oral two times a day  amoxicillin      Capsule 500 milliGRAM(s) Oral every 12 hours    MEDICATIONS  (PRN):  aluminum hydroxide/magnesium hydroxide/simethicone Suspension 30 milliLiter(s) Oral every 4 hours PRN Dyspepsia  hydrALAZINE Injectable 10 milliGRAM(s) IV Push every 6 hours PRN SBP >150  melatonin 3 milliGRAM(s) Oral at bedtime PRN Insomnia

## 2017-08-01 ENCOUNTER — INPATIENT (INPATIENT)
Facility: HOSPITAL | Age: 82
LOS: 10 days | Discharge: HOME HEALTH | DRG: 949 | End: 2017-08-12
Payer: MEDICARE

## 2017-08-01 VITALS
DIASTOLIC BLOOD PRESSURE: 75 MMHG | RESPIRATION RATE: 18 BRPM | OXYGEN SATURATION: 97 % | HEART RATE: 92 BPM | TEMPERATURE: 98 F | SYSTOLIC BLOOD PRESSURE: 156 MMHG

## 2017-08-01 DIAGNOSIS — E78.5 HYPERLIPIDEMIA, UNSPECIFIED: ICD-10-CM

## 2017-08-01 DIAGNOSIS — S06.5X0A TRAUMATIC SUBDURAL HEMORRHAGE WITHOUT LOSS OF CONSCIOUSNESS, INITIAL ENCOUNTER: ICD-10-CM

## 2017-08-01 DIAGNOSIS — N18.4 CHRONIC KIDNEY DISEASE, STAGE 4 (SEVERE): ICD-10-CM

## 2017-08-01 DIAGNOSIS — N17.9 ACUTE KIDNEY FAILURE, UNSPECIFIED: ICD-10-CM

## 2017-08-01 DIAGNOSIS — Z95.1 PRESENCE OF AORTOCORONARY BYPASS GRAFT: ICD-10-CM

## 2017-08-01 DIAGNOSIS — N39.42 INCONTINENCE WITHOUT SENSORY AWARENESS: ICD-10-CM

## 2017-08-01 DIAGNOSIS — Z51.89 ENCOUNTER FOR OTHER SPECIFIED AFTERCARE: ICD-10-CM

## 2017-08-01 DIAGNOSIS — Z87.891 PERSONAL HISTORY OF NICOTINE DEPENDENCE: ICD-10-CM

## 2017-08-01 DIAGNOSIS — Z91.81 HISTORY OF FALLING: ICD-10-CM

## 2017-08-01 DIAGNOSIS — E78.00 PURE HYPERCHOLESTEROLEMIA, UNSPECIFIED: ICD-10-CM

## 2017-08-01 DIAGNOSIS — Z86.73 PERSONAL HISTORY OF TRANSIENT ISCHEMIC ATTACK (TIA), AND CEREBRAL INFARCTION WITHOUT RESIDUAL DEFICITS: ICD-10-CM

## 2017-08-01 DIAGNOSIS — E87.2 ACIDOSIS: ICD-10-CM

## 2017-08-01 DIAGNOSIS — E87.5 HYPERKALEMIA: ICD-10-CM

## 2017-08-01 DIAGNOSIS — I12.9 HYPERTENSIVE CHRONIC KIDNEY DISEASE WITH STAGE 1 THROUGH STAGE 4 CHRONIC KIDNEY DISEASE, OR UNSPECIFIED CHRONIC KIDNEY DISEASE: ICD-10-CM

## 2017-08-01 DIAGNOSIS — I68.0 CEREBRAL AMYLOID ANGIOPATHY: ICD-10-CM

## 2017-08-01 DIAGNOSIS — R53.83 OTHER FATIGUE: ICD-10-CM

## 2017-08-01 DIAGNOSIS — Z95.1 PRESENCE OF AORTOCORONARY BYPASS GRAFT: Chronic | ICD-10-CM

## 2017-08-01 DIAGNOSIS — G40.109 LOCALIZATION-RELATED (FOCAL) (PARTIAL) SYMPTOMATIC EPILEPSY AND EPILEPTIC SYNDROMES WITH SIMPLE PARTIAL SEIZURES, NOT INTRACTABLE, WITHOUT STATUS EPILEPTICUS: ICD-10-CM

## 2017-08-01 DIAGNOSIS — H91.90 UNSPECIFIED HEARING LOSS, UNSPECIFIED EAR: ICD-10-CM

## 2017-08-01 DIAGNOSIS — S06.340D TRAUMATIC HEMORRHAGE OF RIGHT CEREBRUM WITHOUT LOSS OF CONSCIOUSNESS, SUBSEQUENT ENCOUNTER: ICD-10-CM

## 2017-08-01 DIAGNOSIS — I25.10 ATHEROSCLEROTIC HEART DISEASE OF NATIVE CORONARY ARTERY WITHOUT ANGINA PECTORIS: ICD-10-CM

## 2017-08-01 DIAGNOSIS — D63.1 ANEMIA IN CHRONIC KIDNEY DISEASE: ICD-10-CM

## 2017-08-01 LAB
BUN SERPL-MCNC: 35 MG/DL — HIGH (ref 7–23)
CALCIUM SERPL-MCNC: 9 MG/DL — SIGNIFICANT CHANGE UP (ref 8.4–10.5)
CHLORIDE SERPL-SCNC: 107 MMOL/L — SIGNIFICANT CHANGE UP (ref 98–107)
CO2 SERPL-SCNC: 20 MMOL/L — LOW (ref 22–31)
CREAT SERPL-MCNC: 2.26 MG/DL — HIGH (ref 0.5–1.3)
GLUCOSE SERPL-MCNC: 125 MG/DL — HIGH (ref 70–99)
HCT VFR BLD CALC: 33.8 % — LOW (ref 39–50)
HGB BLD-MCNC: 11.6 G/DL — LOW (ref 13–17)
MCHC RBC-ENTMCNC: 32.7 PG — SIGNIFICANT CHANGE UP (ref 27–34)
MCHC RBC-ENTMCNC: 34.3 % — SIGNIFICANT CHANGE UP (ref 32–36)
MCV RBC AUTO: 95.2 FL — SIGNIFICANT CHANGE UP (ref 80–100)
NRBC # FLD: 0 — SIGNIFICANT CHANGE UP
PLATELET # BLD AUTO: 239 K/UL — SIGNIFICANT CHANGE UP (ref 150–400)
PMV BLD: 9.5 FL — SIGNIFICANT CHANGE UP (ref 7–13)
POTASSIUM SERPL-MCNC: 4.8 MMOL/L — SIGNIFICANT CHANGE UP (ref 3.5–5.3)
POTASSIUM SERPL-SCNC: 4.8 MMOL/L — SIGNIFICANT CHANGE UP (ref 3.5–5.3)
RBC # BLD: 3.55 M/UL — LOW (ref 4.2–5.8)
RBC # FLD: 12.6 % — SIGNIFICANT CHANGE UP (ref 10.3–14.5)
SODIUM SERPL-SCNC: 141 MMOL/L — SIGNIFICANT CHANGE UP (ref 135–145)
WBC # BLD: 7.52 K/UL — SIGNIFICANT CHANGE UP (ref 3.8–10.5)
WBC # FLD AUTO: 7.52 K/UL — SIGNIFICANT CHANGE UP (ref 3.8–10.5)

## 2017-08-01 PROCEDURE — 99239 HOSP IP/OBS DSCHRG MGMT >30: CPT

## 2017-08-01 RX ORDER — LACTOBACILLUS ACIDOPHILUS 100MM CELL
1 CAPSULE ORAL DAILY
Qty: 0 | Refills: 0 | Status: DISCONTINUED | OUTPATIENT
Start: 2017-08-01 | End: 2017-08-01

## 2017-08-01 RX ORDER — LACTOBACILLUS ACIDOPHILUS 100MM CELL
1 CAPSULE ORAL
Qty: 0 | Refills: 0 | COMMUNITY
Start: 2017-08-01

## 2017-08-01 RX ORDER — METOPROLOL TARTRATE 50 MG
1 TABLET ORAL
Qty: 0 | Refills: 0 | COMMUNITY

## 2017-08-01 RX ORDER — GABAPENTIN 400 MG/1
1 CAPSULE ORAL
Qty: 0 | Refills: 0 | COMMUNITY

## 2017-08-01 RX ADMIN — Medication 1 TABLET(S): at 13:13

## 2017-08-01 RX ADMIN — LEVETIRACETAM 750 MILLIGRAM(S): 250 TABLET, FILM COATED ORAL at 06:05

## 2017-08-01 RX ADMIN — PANTOPRAZOLE SODIUM 40 MILLIGRAM(S): 20 TABLET, DELAYED RELEASE ORAL at 06:05

## 2017-08-01 RX ADMIN — AMLODIPINE BESYLATE 10 MILLIGRAM(S): 2.5 TABLET ORAL at 06:04

## 2017-08-01 RX ADMIN — Medication 500 MILLIGRAM(S): at 06:05

## 2017-08-01 NOTE — PROGRESS NOTE ADULT - PROBLEM SELECTOR PROBLEM 1
Intracerebral hemorrhage
Intracerebral hemorrhage
Intracranial hemorrhage
Abnormality of gait and mobility
Intracranial hemorrhage

## 2017-08-01 NOTE — DIETITIAN INITIAL EVALUATION ADULT. - ENERGY NEEDS
Weight: 166 pounds (75.6 kg) (7/25)  Height: 67 inches  BMI: 26.1 kg/m^2  Ideal body weight: 148 pounds (67 kg) +/-10%

## 2017-08-01 NOTE — PROGRESS NOTE ADULT - ATTENDING COMMENTS
I have seen and examined this patient with the stroke neurology team.     History was reviewed with the patient and available family members.   ROS: All negative except documented in the HPI.   Neurological exam was performed and agree with exam as documented above.   Laboratory results and imaging studies were reviewed by me.   I agree with the neurology resident note as documented above.    89 Y/O man with vascular risk factors of age and HTN is admitted to the hospital for left upper extremity focal motor seizures. CT brain showed right frontal intracerebral hemorrhage with surrounding vasogenic edema. MRI of brain showed stable right frontal ICH. MRA did not show any vascular malformation being the etiology of his ICH.    Impression:  Nontraumatic right hemispheric cortically located ICH - likely etiology being possible cerebral amyloid angiopathy and less likely to be due to underlying vascular malformation or tissue pathology. Simple partial seizure - likely symptomatic seizure from ICH    Plan:  - Blood pressure goal - gradual normotension  - Continue with vascular risk factors modification  - Routine EEG  - Continue with Keppra for seizure prophylaxis. Duration of AED is expected to be 3-6 months based on clinical and radiological improvement  - Would continue to follow    Above-mentioned plan was discussed with patient in detail. All the questions were answered and concerns were addressed.
Intraparenchymal hematoma/SAH/?subdural, with 2 szs, now awake alert, oriented, guarded but better
still awaiting EEG; keppra re dosing based on that
d/w wife at bedside.
DC to  rehab  today
Pending rehab placement
plan as above, edited as appropriate. I spoke to the patient's family in detail regarding the above mentioned plan.

## 2017-08-01 NOTE — PROGRESS NOTE ADULT - PROBLEM SELECTOR PROBLEM 3
GORGE (acute kidney injury)

## 2017-08-01 NOTE — PROGRESS NOTE ADULT - PROBLEM SELECTOR PROBLEM 5
Hyperlipidemia, unspecified hyperlipidemia type

## 2017-08-01 NOTE — PROGRESS NOTE ADULT - PROBLEM SELECTOR PLAN 4
keep SBP<140
keep SBP<140,   Pt on  Norvasc and as needed  Hydralazine   will resume lasix upon discharge   monitor BP  in Rehab
keep SBP<140, c/w Norvasc and Hydralazine
keep SBP<140, BP stable presently off meds

## 2017-08-01 NOTE — DIETITIAN INITIAL EVALUATION ADULT. - NS AS NUTRI INTERV MEALS SNACK
1. Continue DASH/TLC (cholesterol and sodium restricted) diet which remains appropriate 2. Encourage po intake 3. Monitor weights, labs, BM's, skin integrity, p.o. intake

## 2017-08-01 NOTE — PROGRESS NOTE ADULT - SUBJECTIVE AND OBJECTIVE BOX
Patient is a 89y old  Male who presents with a chief complaint of Seizure, Subarachnoid Hemorrhage (26 Jul 2017 14:47)      SUBJECTIVE / OVERNIGHT EVENTS:    MEDICATIONS  (STANDING):  atorvastatin 40 milliGRAM(s) Oral at bedtime  pantoprazole   Suspension 40 milliGRAM(s) Oral before breakfast  amLODIPine   Tablet 10 milliGRAM(s) Oral daily  levETIRAcetam 750 milliGRAM(s) Oral two times a day  amoxicillin      Capsule 500 milliGRAM(s) Oral every 12 hours  lactobacillus acidophilus 1 Tablet(s) Oral daily    MEDICATIONS  (PRN):  aluminum hydroxide/magnesium hydroxide/simethicone Suspension 30 milliLiter(s) Oral every 4 hours PRN Dyspepsia  hydrALAZINE Injectable 10 milliGRAM(s) IV Push every 6 hours PRN SBP >150  melatonin 3 milliGRAM(s) Oral at bedtime PRN Insomnia      Vital Signs Last 24 Hrs  T(C): 36.7 (01 Aug 2017 13:05), Max: 36.8 (01 Aug 2017 06:04)  T(F): 98 (01 Aug 2017 13:05), Max: 98.2 (01 Aug 2017 06:04)  HR: 92 (01 Aug 2017 13:05) (92 - 96)  BP: 156/75 (01 Aug 2017 13:05) (139/71 - 156/75)  BP(mean): --  RR: 18 (01 Aug 2017 13:05) (18 - 18)  SpO2: 97% (01 Aug 2017 13:05) (97% - 98%)          I&O's Summary    PHYSICAL EXAM:  GENERAL: NAD, well-developed  HEAD:  Atraumatic, Normocephalic  EYES: EOMI, PERRLA, conjunctiva and sclera clear  NECK: Supple, No JVD  CHEST/LUNG: Clear to auscultation bilaterally; No wheeze  HEART: Regular rate and rhythm; No murmurs, rubs, or gallops  ABDOMEN: Soft, Nontender, Nondistended; Bowel sounds present  EXTREMITIES:  2+ Peripheral Pulses, No clubbing, cyanosis, or edema  PSYCH: AAOx3  NEUROLOGY: non-focal, power 5/5 in all extremities       LABS:                        11.6   7.52  )-----------( 239      ( 01 Aug 2017 06:17 )             33.8     08-01    141  |  107  |  35<H>  ----------------------------<  125<H>  4.8   |  20<L>  |  2.26<H>    Ca    9.0      01 Aug 2017 06:17  Mg     1.8     07-31                RADIOLOGY & ADDITIONAL TESTS:    Imaging Personally Reviewed:    Consultant(s) Notes Reviewed:      Care Discussed with Consultants/Other Providers:

## 2017-08-01 NOTE — PROGRESS NOTE ADULT - PROBLEM SELECTOR PROBLEM 6
Coronary artery disease involving native coronary artery of native heart without angina pectoris

## 2017-08-01 NOTE — PROGRESS NOTE ADULT - PROBLEM SELECTOR PLAN 6
s/p CABG  holding antiplatelet therapy for now
s/p CABG  holding antiplatelet therapy for now given ICH
s/p CABG  holding antiplatelet therapy

## 2017-08-01 NOTE — PROGRESS NOTE ADULT - PROBLEM SELECTOR PLAN 1
Repeat head imaging shows stable hemorrhages (MRI and CT).   BP satisfactory, c/w keeping SBP <140, regular neurochecks   DC to  rehab center  when bed available   TTE noted, grossly normal LV and RV fn   Neuro recs appreciated,   recommend Follow up with vascular neurology Dr. Segura or Dr. Sargent in 2-4 weeks  pt will need repeat  MRI  and EEG in 3-6 months   - Cleared for discharge from neuro standpoint Nontraumatic ICH with brain edema  Repeat head imaging shows stable hemorrhages (MRI and CT).   BP satisfactory, c/w keeping SBP <140, regular neurochecks   DC to  rehab center  when bed available   TTE noted, grossly normal LV and RV fn   Neuro recs appreciated,   recommend Follow up with vascular neurology Dr. Segura or Dr. Sargent in 2-4 weeks  pt will need repeat  MRI  and EEG in 3-6 months   - Cleared for discharge from neuro standpoint

## 2017-08-01 NOTE — PROGRESS NOTE ADULT - PROBLEM SELECTOR PLAN 9
As per speech and swallow: Regular diet with thin liquids
As per speech and swallow: Regular diet with thin liquids
As per speech and swallow: Regular diet with thin liquids  Pending rehab placement

## 2017-08-01 NOTE — DIETITIAN INITIAL EVALUATION ADULT. - OTHER INFO
Patient seen for extended length of stay. Patient hx of HTN, BPH, CAD presented  with sudden-onset of left focal UE seizure and left sided facial droop and weakness found to have a R frontal IPH. S/p MICU transfer. Wife reports patient has a small appetite but has good po intake. Patient mainly likes to consume red meats (steak, lamb, chops) sweets (cookies) and some fresh fruit. Patient does not like to consume vegetables. Wife has Ensure for patient to supplement diet. Per staff patient eating well in-house. No GI distress (nausea/vomiting/diarrhea/constipation.) Patient reports taking tums at time of visit for some small stomach upset. No known food allergies. No chewing/swallowing difficulties reported.

## 2017-08-01 NOTE — PROGRESS NOTE ADULT - PROBLEM SELECTOR PROBLEM 2
Focal seizure

## 2017-08-02 PROCEDURE — 93010 ELECTROCARDIOGRAM REPORT: CPT

## 2017-08-02 PROCEDURE — 99232 SBSQ HOSP IP/OBS MODERATE 35: CPT

## 2017-08-03 PROCEDURE — 93970 EXTREMITY STUDY: CPT | Mod: 26

## 2017-08-03 PROCEDURE — 99232 SBSQ HOSP IP/OBS MODERATE 35: CPT

## 2017-08-04 PROCEDURE — 99232 SBSQ HOSP IP/OBS MODERATE 35: CPT

## 2017-08-05 PROCEDURE — 99232 SBSQ HOSP IP/OBS MODERATE 35: CPT

## 2017-08-06 PROCEDURE — 99232 SBSQ HOSP IP/OBS MODERATE 35: CPT

## 2017-08-07 PROCEDURE — 99232 SBSQ HOSP IP/OBS MODERATE 35: CPT

## 2017-08-08 PROCEDURE — 99233 SBSQ HOSP IP/OBS HIGH 50: CPT

## 2017-08-09 PROCEDURE — 99232 SBSQ HOSP IP/OBS MODERATE 35: CPT

## 2017-08-10 PROCEDURE — 99223 1ST HOSP IP/OBS HIGH 75: CPT

## 2017-08-10 PROCEDURE — 99232 SBSQ HOSP IP/OBS MODERATE 35: CPT

## 2017-08-11 PROCEDURE — 99232 SBSQ HOSP IP/OBS MODERATE 35: CPT

## 2017-08-12 PROCEDURE — 99232 SBSQ HOSP IP/OBS MODERATE 35: CPT

## 2017-08-12 PROCEDURE — 99238 HOSP IP/OBS DSCHRG MGMT 30/<: CPT

## 2017-08-17 PROCEDURE — 92610 EVALUATE SWALLOWING FUNCTION: CPT

## 2017-08-17 PROCEDURE — 80069 RENAL FUNCTION PANEL: CPT

## 2017-08-17 PROCEDURE — 93005 ELECTROCARDIOGRAM TRACING: CPT

## 2017-08-17 PROCEDURE — 97110 THERAPEUTIC EXERCISES: CPT

## 2017-08-17 PROCEDURE — 83010 ASSAY OF HAPTOGLOBIN QUANT: CPT

## 2017-08-17 PROCEDURE — 82728 ASSAY OF FERRITIN: CPT

## 2017-08-17 PROCEDURE — 85610 PROTHROMBIN TIME: CPT

## 2017-08-17 PROCEDURE — 82746 ASSAY OF FOLIC ACID SERUM: CPT

## 2017-08-17 PROCEDURE — 97530 THERAPEUTIC ACTIVITIES: CPT

## 2017-08-17 PROCEDURE — 86334 IMMUNOFIX E-PHORESIS SERUM: CPT

## 2017-08-17 PROCEDURE — 84550 ASSAY OF BLOOD/URIC ACID: CPT

## 2017-08-17 PROCEDURE — 82607 VITAMIN B-12: CPT

## 2017-08-17 PROCEDURE — 97167 OT EVAL HIGH COMPLEX 60 MIN: CPT

## 2017-08-17 PROCEDURE — 83036 HEMOGLOBIN GLYCOSYLATED A1C: CPT

## 2017-08-17 PROCEDURE — 85045 AUTOMATED RETICULOCYTE COUNT: CPT

## 2017-08-17 PROCEDURE — 97116 GAIT TRAINING THERAPY: CPT

## 2017-08-17 PROCEDURE — 85027 COMPLETE CBC AUTOMATED: CPT

## 2017-08-17 PROCEDURE — 85025 COMPLETE CBC W/AUTO DIFF WBC: CPT

## 2017-08-17 PROCEDURE — 80048 BASIC METABOLIC PNL TOTAL CA: CPT

## 2017-08-17 PROCEDURE — 97535 SELF CARE MNGMENT TRAINING: CPT

## 2017-08-17 PROCEDURE — 84165 PROTEIN E-PHORESIS SERUM: CPT

## 2017-08-17 PROCEDURE — 93970 EXTREMITY STUDY: CPT

## 2017-08-17 PROCEDURE — 84155 ASSAY OF PROTEIN SERUM: CPT

## 2017-08-17 PROCEDURE — 97163 PT EVAL HIGH COMPLEX 45 MIN: CPT

## 2017-08-17 PROCEDURE — 83550 IRON BINDING TEST: CPT

## 2017-08-17 PROCEDURE — 92523 SPEECH SOUND LANG COMPREHEN: CPT

## 2017-08-17 PROCEDURE — 81003 URINALYSIS AUTO W/O SCOPE: CPT

## 2017-08-17 PROCEDURE — 92507 TX SP LANG VOICE COMM INDIV: CPT

## 2017-08-17 PROCEDURE — 83735 ASSAY OF MAGNESIUM: CPT

## 2017-08-17 PROCEDURE — 80053 COMPREHEN METABOLIC PANEL: CPT

## 2017-11-18 PROBLEM — E78.5 HYPERLIPIDEMIA, UNSPECIFIED: Chronic | Status: ACTIVE | Noted: 2017-07-25

## 2019-01-04 ENCOUNTER — INPATIENT (INPATIENT)
Facility: HOSPITAL | Age: 84
LOS: 0 days | Discharge: HOME | DRG: 689 | End: 2019-01-05
Attending: HOSPITALIST | Admitting: HOSPITALIST
Payer: MEDICARE

## 2019-01-04 VITALS
OXYGEN SATURATION: 98 % | DIASTOLIC BLOOD PRESSURE: 73 MMHG | SYSTOLIC BLOOD PRESSURE: 147 MMHG | WEIGHT: 166.89 LBS | RESPIRATION RATE: 18 BRPM | HEART RATE: 64 BPM | TEMPERATURE: 98 F | HEIGHT: 67 IN

## 2019-01-04 DIAGNOSIS — N39.0 URINARY TRACT INFECTION, SITE NOT SPECIFIED: ICD-10-CM

## 2019-01-04 DIAGNOSIS — Z95.1 PRESENCE OF AORTOCORONARY BYPASS GRAFT: Chronic | ICD-10-CM

## 2019-01-04 PROBLEM — I25.10 ATHEROSCLEROTIC HEART DISEASE OF NATIVE CORONARY ARTERY WITHOUT ANGINA PECTORIS: Chronic | Status: ACTIVE | Noted: 2017-07-25

## 2019-01-04 PROBLEM — I10 ESSENTIAL (PRIMARY) HYPERTENSION: Chronic | Status: ACTIVE | Noted: 2017-07-25

## 2019-01-04 LAB
ALBUMIN SERPL ELPH-MCNC: 3.5 G/DL — SIGNIFICANT CHANGE UP (ref 3.3–5)
ALP SERPL-CCNC: 74 U/L — SIGNIFICANT CHANGE UP (ref 40–120)
ALT FLD-CCNC: 14 U/L — SIGNIFICANT CHANGE UP (ref 10–45)
ANION GAP SERPL CALC-SCNC: 10 MMOL/L — SIGNIFICANT CHANGE UP (ref 5–17)
APPEARANCE UR: ABNORMAL
AST SERPL-CCNC: 14 U/L — SIGNIFICANT CHANGE UP (ref 10–40)
BACTERIA # UR AUTO: ABNORMAL
BASOPHILS # BLD AUTO: 0 K/UL — SIGNIFICANT CHANGE UP (ref 0–0.2)
BASOPHILS NFR BLD AUTO: 0.5 % — SIGNIFICANT CHANGE UP (ref 0–2)
BILIRUB SERPL-MCNC: 0.3 MG/DL — SIGNIFICANT CHANGE UP (ref 0.2–1.2)
BILIRUB UR-MCNC: NEGATIVE — SIGNIFICANT CHANGE UP
BUN SERPL-MCNC: 61 MG/DL — HIGH (ref 7–23)
CALCIUM SERPL-MCNC: 8.8 MG/DL — SIGNIFICANT CHANGE UP (ref 8.4–10.5)
CHLORIDE SERPL-SCNC: 115 MMOL/L — HIGH (ref 96–108)
CO2 SERPL-SCNC: 15 MMOL/L — LOW (ref 22–31)
COLOR SPEC: SIGNIFICANT CHANGE UP
CREAT SERPL-MCNC: 3.23 MG/DL — HIGH (ref 0.5–1.3)
DIFF PNL FLD: ABNORMAL
EOSINOPHIL # BLD AUTO: 0.2 K/UL — SIGNIFICANT CHANGE UP (ref 0–0.5)
EOSINOPHIL NFR BLD AUTO: 3.6 % — SIGNIFICANT CHANGE UP (ref 0–6)
EPI CELLS # UR: 0 /HPF — SIGNIFICANT CHANGE UP
GLUCOSE SERPL-MCNC: 101 MG/DL — HIGH (ref 70–99)
GLUCOSE UR QL: NEGATIVE — SIGNIFICANT CHANGE UP
HCT VFR BLD CALC: 26.6 % — LOW (ref 39–50)
HGB BLD-MCNC: 9.4 G/DL — LOW (ref 13–17)
HYALINE CASTS # UR AUTO: 5 /LPF — HIGH (ref 0–2)
KETONES UR-MCNC: NEGATIVE — SIGNIFICANT CHANGE UP
LEUKOCYTE ESTERASE UR-ACNC: ABNORMAL
LYMPHOCYTES # BLD AUTO: 1.3 K/UL — SIGNIFICANT CHANGE UP (ref 1–3.3)
LYMPHOCYTES # BLD AUTO: 26 % — SIGNIFICANT CHANGE UP (ref 13–44)
MCHC RBC-ENTMCNC: 33.4 PG — SIGNIFICANT CHANGE UP (ref 27–34)
MCHC RBC-ENTMCNC: 35.3 GM/DL — SIGNIFICANT CHANGE UP (ref 32–36)
MCV RBC AUTO: 94.8 FL — SIGNIFICANT CHANGE UP (ref 80–100)
MONOCYTES # BLD AUTO: 0.6 K/UL — SIGNIFICANT CHANGE UP (ref 0–0.9)
MONOCYTES NFR BLD AUTO: 11.6 % — SIGNIFICANT CHANGE UP (ref 2–14)
NEUTROPHILS # BLD AUTO: 2.9 K/UL — SIGNIFICANT CHANGE UP (ref 1.8–7.4)
NEUTROPHILS NFR BLD AUTO: 58.3 % — SIGNIFICANT CHANGE UP (ref 43–77)
NITRITE UR-MCNC: NEGATIVE — SIGNIFICANT CHANGE UP
PH UR: 6.5 — SIGNIFICANT CHANGE UP (ref 5–8)
PLATELET # BLD AUTO: 138 K/UL — LOW (ref 150–400)
POTASSIUM SERPL-MCNC: 5.4 MMOL/L — HIGH (ref 3.5–5.3)
POTASSIUM SERPL-SCNC: 5.4 MMOL/L — HIGH (ref 3.5–5.3)
PROT SERPL-MCNC: 6.2 G/DL — SIGNIFICANT CHANGE UP (ref 6–8.3)
PROT UR-MCNC: ABNORMAL
RBC # BLD: 2.8 M/UL — LOW (ref 4.2–5.8)
RBC # FLD: 13.4 % — SIGNIFICANT CHANGE UP (ref 10.3–14.5)
RBC CASTS # UR COMP ASSIST: 2 /HPF — SIGNIFICANT CHANGE UP (ref 0–4)
SODIUM SERPL-SCNC: 140 MMOL/L — SIGNIFICANT CHANGE UP (ref 135–145)
SP GR SPEC: 1.01 — SIGNIFICANT CHANGE UP (ref 1.01–1.02)
UROBILINOGEN FLD QL: NEGATIVE — SIGNIFICANT CHANGE UP
WBC # BLD: 4.9 K/UL — SIGNIFICANT CHANGE UP (ref 3.8–10.5)
WBC # FLD AUTO: 4.9 K/UL — SIGNIFICANT CHANGE UP (ref 3.8–10.5)
WBC UR QL: 58 /HPF — HIGH (ref 0–5)

## 2019-01-04 PROCEDURE — 93010 ELECTROCARDIOGRAM REPORT: CPT

## 2019-01-04 PROCEDURE — 99285 EMERGENCY DEPT VISIT HI MDM: CPT | Mod: GC,25

## 2019-01-04 PROCEDURE — 72125 CT NECK SPINE W/O DYE: CPT | Mod: 26

## 2019-01-04 PROCEDURE — 70450 CT HEAD/BRAIN W/O DYE: CPT | Mod: 26

## 2019-01-04 PROCEDURE — 72170 X-RAY EXAM OF PELVIS: CPT | Mod: 26

## 2019-01-04 PROCEDURE — 73130 X-RAY EXAM OF HAND: CPT | Mod: 26,LT

## 2019-01-04 PROCEDURE — 71250 CT THORAX DX C-: CPT | Mod: 26

## 2019-01-04 PROCEDURE — 73090 X-RAY EXAM OF FOREARM: CPT | Mod: 26,LT

## 2019-01-04 PROCEDURE — 99223 1ST HOSP IP/OBS HIGH 75: CPT

## 2019-01-04 PROCEDURE — 71045 X-RAY EXAM CHEST 1 VIEW: CPT | Mod: 26

## 2019-01-04 RX ORDER — CEFTRIAXONE 500 MG/1
1 INJECTION, POWDER, FOR SOLUTION INTRAMUSCULAR; INTRAVENOUS EVERY 24 HOURS
Qty: 0 | Refills: 0 | Status: DISCONTINUED | OUTPATIENT
Start: 2019-01-05 | End: 2019-01-05

## 2019-01-04 RX ORDER — SODIUM CHLORIDE 9 MG/ML
1000 INJECTION INTRAMUSCULAR; INTRAVENOUS; SUBCUTANEOUS ONCE
Qty: 0 | Refills: 0 | Status: COMPLETED | OUTPATIENT
Start: 2019-01-04 | End: 2019-01-04

## 2019-01-04 RX ORDER — LEVETIRACETAM 250 MG/1
750 TABLET, FILM COATED ORAL
Qty: 0 | Refills: 0 | Status: DISCONTINUED | OUTPATIENT
Start: 2019-01-04 | End: 2019-01-05

## 2019-01-04 RX ORDER — LANOLIN ALCOHOL/MO/W.PET/CERES
3 CREAM (GRAM) TOPICAL AT BEDTIME
Qty: 0 | Refills: 0 | Status: DISCONTINUED | OUTPATIENT
Start: 2019-01-04 | End: 2019-01-05

## 2019-01-04 RX ORDER — TETANUS TOXOID, REDUCED DIPHTHERIA TOXOID AND ACELLULAR PERTUSSIS VACCINE, ADSORBED 5; 2.5; 8; 8; 2.5 [IU]/.5ML; [IU]/.5ML; UG/.5ML; UG/.5ML; UG/.5ML
0.5 SUSPENSION INTRAMUSCULAR ONCE
Qty: 0 | Refills: 0 | Status: COMPLETED | OUTPATIENT
Start: 2019-01-04 | End: 2019-01-04

## 2019-01-04 RX ORDER — CEFTRIAXONE 500 MG/1
1 INJECTION, POWDER, FOR SOLUTION INTRAMUSCULAR; INTRAVENOUS ONCE
Qty: 0 | Refills: 0 | Status: COMPLETED | OUTPATIENT
Start: 2019-01-04 | End: 2019-01-04

## 2019-01-04 RX ORDER — AMLODIPINE BESYLATE 2.5 MG/1
5 TABLET ORAL ONCE
Qty: 0 | Refills: 0 | Status: COMPLETED | OUTPATIENT
Start: 2019-01-04 | End: 2019-01-04

## 2019-01-04 RX ADMIN — AMLODIPINE BESYLATE 5 MILLIGRAM(S): 2.5 TABLET ORAL at 22:47

## 2019-01-04 RX ADMIN — CEFTRIAXONE 100 GRAM(S): 500 INJECTION, POWDER, FOR SOLUTION INTRAMUSCULAR; INTRAVENOUS at 20:19

## 2019-01-04 RX ADMIN — SODIUM CHLORIDE 1000 MILLILITER(S): 9 INJECTION INTRAMUSCULAR; INTRAVENOUS; SUBCUTANEOUS at 19:22

## 2019-01-04 RX ADMIN — TETANUS TOXOID, REDUCED DIPHTHERIA TOXOID AND ACELLULAR PERTUSSIS VACCINE, ADSORBED 0.5 MILLILITER(S): 5; 2.5; 8; 8; 2.5 SUSPENSION INTRAMUSCULAR at 17:25

## 2019-01-04 NOTE — ED ADULT NURSE NOTE - OBJECTIVE STATEMENT
89 year old male presents to ED via [facility] to ED from home complaining of fall with skin tear on arm. Per EMS wife saw him fall in the bathroom and denies head injury or LOC. 89 year old male presents to ED via [facility] to ED from home complaining of fall with skin tear on arm. Per EMS wife saw him fall in the bathroom and denies head injury or LOC. Per wife patient tried to put his pants on and put both legs in one pant hole, lost his balance and fell, denies hitting head/LOC. Patient is awake, alert, confused (at baseline). Denies HA, CP, SOB, abd pain, NVD. multiple skin tears noted to left arm and one on right. Patient undressed and placed into gown, call bell in hand and side rails up with bed in lowest position for safety. blanket provided. Comfort and safety provided.

## 2019-01-04 NOTE — ED PROVIDER NOTE - OBJECTIVE STATEMENT
Simona Munoz MD PGY-1 Pt is a 89 yo man with PMH CVA (on ASA), CAD, HTN, HLD, hx sundowning who presents after fall. Per wife, she was helping pt into his pants when he lost his balance and fell, with skin avulsions to both arms (L>R) with overlying . Wife "does not think" patient hit head, but not sure. States pt fell onto hardfloor with carpet. Wife states pt did not lose consciousness. Pt poor historian but denying dizziness/lightheadedness prior to fall. Simona Munoz MD PGY-1 Pt is a 89 yo man with PMH CVA (on ASA), CAD, HTN, HLD, hx sundowning who presents after fall. Per wife at bedside, she was helping pt into his pants when he lost his balance and fell, with skin avulsions to both arms (L>R) with overlying bruising. Wife "does not think" patient hit head, but not sure. States pt fell onto hardfloor with carpet. Wife states pt did not lose consciousness. Pt poor historian but denying dizziness/lightheadedness prior to fall. Per wife, pt has been progressively more confused over the last few days. Wife states that pt has hx of sundowning. Simona Munoz MD PGY-1 Pt is a 89 yo man with PMH CVA (on ASA), CAD, HTN, HLD, hx sundowning who presents after fall. Per wife at bedside, she was helping pt into his pants when he lost his balance and fell, with skin avulsions to both arms (L>R) with overlying bruising. Wife "does not think" patient hit head, but not sure. States pt fell onto hardfloor with carpet. Wife states pt did not lose consciousness. Pt poor historian but denying dizziness/lightheadedness prior to fall. Per wife, pt has been progressively more confused over the last 3 weeks. Wife states that pt has hx of sundowning. Per wife, pt currently being treated for a UTI.

## 2019-01-04 NOTE — ED PROCEDURE NOTE - CPROC ED POST PROC CARE GUIDE1
Instructed patient/caregiver regarding signs and symptoms of infection./Verbal/written post procedure instructions were given to patient/caregiver./Keep the cast/splint/dressing clean and dry./Instructed patient/caregiver to follow-up with primary care physician.
Keep the cast/splint/dressing clean and dry./Instructed patient/caregiver to follow-up with primary care physician./Verbal/written post procedure instructions were given to patient/caregiver./Instructed patient/caregiver regarding signs and symptoms of infection.

## 2019-01-04 NOTE — H&P ADULT - PMH
CAD (coronary artery disease)    Hemorrhagic cerebrovascular accident (CVA)    HTN (hypertension)    Hyperlipidemia, unspecified hyperlipidemia type

## 2019-01-04 NOTE — H&P ADULT - PROBLEM SELECTOR PLAN 6
--hold antiplatelets as unclear if he restarted after ICH last year. Need clarification of med list in the morning.

## 2019-01-04 NOTE — H&P ADULT - PROBLEM SELECTOR PLAN 3
--Cr elevated from baseline of 2.2 to about 3.2.   --potentially intravascularly depleted from UTI. May still be on lasix but med list is unclear at the moment. Does have some peripheral edema.   --Received IVF in the ED. Hold lasix tonight.   --check urine studies and trend BMP in the morning.

## 2019-01-04 NOTE — H&P ADULT - PROBLEM SELECTOR PLAN 5
--Hgb 9s, had some lacerations but no signs of significant bleeding. MCV high normal, can check B12 and folic acid. May also be related to CKD. Will check iron studies.

## 2019-01-04 NOTE — H&P ADULT - PROBLEM SELECTOR PLAN 7
--BP elevated to 180, patient thinks he is probably still on amlodipine.   --will restart amlodipine.

## 2019-01-04 NOTE — H&P ADULT - ASSESSMENT
89M PMH CAD/CABG, HTN, HLD, CKD 4, prior hemorrhagic CVA (with focal seizure activity), mild dementia presents with fall, confusion, loss of balance, admitted with encephalopathy from UTI as well as GORGE on CKD.

## 2019-01-04 NOTE — H&P ADULT - NSHPLABSRESULTS_GEN_ALL_CORE
EKG, labs, and imaging personally reviewed and interpreted.     EKG: NSR, 1st degree block    LABS:                        9.4    4.9   )-----------( 138      ( 2019 17:46 )             26.6     140  |  115<H>  |  61<H>  ----------------------------<  101<H>  5.4<H>   |  15<L>  |  3.23<H>    Ca    8.8      2019 17:56    TPro  6.2  /  Alb  3.5  /  TBili  0.3  /  DBili  x   /  AST  14  /  ALT  14  /  AlkPhos  74  -    Urinalysis Basic - ( 2019 19:46 )    Color: Light Yellow / Appearance: Slightly Turbid / S.012 / pH: x  Gluc: x / Ketone: Negative  / Bili: Negative / Urobili: Negative   Blood: x / Protein: 300 mg/dL / Nitrite: Negative   Leuk Esterase: Moderate / RBC: 2 /hpf / WBC 58 /hpf   Sq Epi: x / Non Sq Epi: 0 /hpf / Bacteria: Many    RADIOLOGY & ADDITIONAL TESTS:  Imaging Personally Reviewed:    Xray pelvis:   IMPRESSION:  No acute displaced fracture or dislocation.    Xray L forearm:  IMPRESSION:  Limited exam. No acute displaced fracture or dislocation.    Xray L hand:  IMPRESSION:  Limited exam. No acute displaced fracture or dislocation.    CT head/neck:  IMPRESSION: Mild degenerative changes of the cervical spine. Question   left apical pneumothorax versus large bleb.    CT chest:   FINDINGS:  CHEST:   LUNGS AND LARGE AIRWAYS: Patent central airways.  Severe emphysema with   apical bullous changes. No focal consolidations or pulmonary edema.   Multiple calcific granulomata.  PLEURA: No pleural effusion or pneumothoraces.  VESSELS: Atherosclerotic disease.  HEART: Status post CABG. Heart size is normal. No pericardial effusion.   Coronary artery calcifications.  MEDIASTINUM AND NATIVIDAD: No lymphadenopathy.  CHEST WALL AND LOWER NECK: Sternotomy.  VISUALIZED UPPER ABDOMEN: Within normal limits.  BONES: Old healed fracture of the anterior aspect of the left 2nd rib.   Old healed fracture of the posterior aspect of the right 12th rib.   Degenerative changes of the spine.    IMPRESSION:  No acute traumatic chest pathology.    Consultant(s) Notes Reviewed:  Yes  Care Discussed with Consultants/Other Providers: Yes

## 2019-01-04 NOTE — H&P ADULT - PROBLEM SELECTOR PLAN 8
--patient had focal seizure activity with ICH last year.   --c/w Keppra. Check level. Clarify dose in the morning.

## 2019-01-04 NOTE — ED PROVIDER NOTE - ATTENDING CONTRIBUTION TO CARE
attending Clark: 89yM h/o stroke (with residual seizures on keppra and ASA), CAD, HTN, HLD, presents with wife after fall while putting on pants. Has skin avulsions to bilateral upper extremities. Reportedly with no head trauma or LOC. No prolonged downtime. Wife also reports pt with some AMS x 1 week, seen by PMD yesterday for AMS where labs and urine were tested but results not known. Will administer tdap, Steri-strips for skin avulsions, CTH/Cspine, labs, UA, xrays and reassess

## 2019-01-04 NOTE — ED ADULT NURSE REASSESSMENT NOTE - NS ED NURSE REASSESS COMMENT FT1
MD vergara aware of rectal temp. pt placed on bear [facility]. will reassess rectal temp.

## 2019-01-04 NOTE — H&P ADULT - NSHPPHYSICALEXAM_GEN_ALL_CORE
Vital Signs Last 24 Hrs  T(C): 35.1 (01-04-19 @ 22:28)  T(F): 95.2 (01-04-19 @ 22:28), Max: 98 (01-04-19 @ 19:45)  HR: 77 (01-04-19 @ 22:28) (64 - 77)  BP: 180/90 (01-04-19 @ 22:28)  BP(mean): --  RR: 16 (01-04-19 @ 22:28) (16 - 18)  SpO2: 100% (01-04-19 @ 22:28) (98% - 100%)  Wt(kg): --    PHYSICAL EXAM:  GENERAL: NAD, well-developed, elderly, frail  HEAD:  Atraumatic, Normocephalic  EYES: EOMI, PERRLA, conjunctiva and sclera clear  NECK: Supple, No JVD  CHEST/LUNG: Clear to auscultation bilaterally; No wheeze  HEART: Regular rate and rhythm; No murmurs, rubs, or gallops  ABDOMEN: Soft, Nontender, Nondistended; Bowel sounds present  EXTREMITIES: 2+ LE edema; numerous b/l forearm and hand abrasions with steristrips, ecchymoses; 2+ Peripheral Pulses  PSYCH: AAOx3  NEUROLOGY: non-focal  SKIN: No rashes or lesions Vital Signs Last 24 Hrs  T(C): 35.1 (01-04-19 @ 22:28)  T(F): 95.2 (01-04-19 @ 22:28), Max: 98 (01-04-19 @ 19:45)  HR: 77 (01-04-19 @ 22:28) (64 - 77)  BP: 180/90 (01-04-19 @ 22:28)  BP(mean): --  RR: 16 (01-04-19 @ 22:28) (16 - 18)  SpO2: 100% (01-04-19 @ 22:28) (98% - 100%)  Wt(kg): --    PHYSICAL EXAM:  GENERAL: NAD, well-developed, elderly, frail  HEAD:  Atraumatic, Normocephalic  EYES: EOMI, PERRLA, conjunctiva and sclera clear  NECK: Supple, No JVD  CHEST/LUNG: Clear to auscultation bilaterally; No wheeze  HEART: Regular rate and rhythm; No murmurs, rubs, or gallops  ABDOMEN: Soft, Nontender, Nondistended; Bowel sounds present  EXTREMITIES: 2+ LE edema; numerous b/l forearm and hand abrasions with steri strips, ecchymoses; 2+ Peripheral Pulses  PSYCH: AAOx3  NEUROLOGY: non-focal  SKIN: No rashes or lesions

## 2019-01-04 NOTE — H&P ADULT - PROBLEM SELECTOR PLAN 1
--per wife's reports, has had increasing confusion and worsening sundowning over the past few weeks.   --Likely in the setting of infection, will treat with CTX.   --Negative CT head and neck  --check B12, folic acid, TSH --per wife's reports, has had increasing confusion and worsening sundowning over the past few weeks.   --Likely in the setting of infection, will treat with CTX.   --Negative CT head and neck  --check B12, folic acid, TSH  --fall precautions

## 2019-01-04 NOTE — ED PROCEDURE NOTE - CPROC ED LACER REPAIR DETAIL1
The wound was explored to base in bloodless field./No foreign body The wound was explored to base in bloodless field./No foreign body/Multiple flaps were aligned.

## 2019-01-04 NOTE — H&P ADULT - NSHPSOURCEINFOTX_GEN_ALL_CORE
Attempted to reach wife on listed numbers [phone] and [phone] but was unsuccessful. One number listed as out of service, one number sounded off-the-hook. Unable to leave a voicemail.

## 2019-01-04 NOTE — ED ADULT NURSE NOTE - NSIMPLEMENTINTERV_GEN_ALL_ED
Implemented All Fall Risk Interventions:  [place] to call system. Call bell, personal items and telephone within reach. Instruct patient to call for assistance. Room bathroom lighting operational. Non-slip footwear when patient is off stretcher. Physically safe environment: no spills, clutter or unnecessary equipment. Stretcher in lowest position, wheels locked, appropriate side rails in place. Provide visual cue, wrist band, yellow gown, etc. Monitor gait and stability. Monitor for mental status changes and reorient to person, place, and time. Review medications for side effects contributing to fall risk. Reinforce activity limits and safety measures with patient and family.

## 2019-01-04 NOTE — H&P ADULT - PROBLEM SELECTOR PLAN 9
--Tried calling his wife, but none of the listed numbers are working.   --Emailed pharmacy tech team for assistance.   --Needs clarification.

## 2019-01-04 NOTE — ED PROCEDURE NOTE - ATTENDING CONTRIBUTION TO CARE
I was present for key portions of above procedure.
I was present for key portions of above procedure.

## 2019-01-04 NOTE — ED PROVIDER NOTE - PHYSICAL EXAMINATION
PHYSICAL EXAM:   General: thin-appearing, odor of urine  HEENT: NC/AT, neck with FUll ROM  Cardiovascular: regular rate, + S1/S2,   Respiratory: nonlabored repirations  Abdominal: soft, nontender, nondistended, no rebound, guarding or rigidity, +bowel sounds  Back: no midline spinal tenderness, no lesions or eccyhmosis on back  Extremities: 2+ pitting LE edema b/l. angulated skin avulsion on L hand with overlying eccyhymosis, linear avulsion of skin on distal and proximal L forearm with overlying ecchymosis, skin avulsion of L elbow  Neuro: Alert and oriented x3.   Skin: appropriate color, warm, dry except as aforementioned  -Simona Munoz PGY-1 PHYSICAL EXAM:   General: thin-appearing, odor of urine  HEENT: NC/AT, neck with FUll ROM  Cardiovascular: regular rate, + S1/S2,   Respiratory: nonlabored respirations  Abdominal: soft, nontender, nondistended, no rebound, guarding or rigidity, +bowel sounds  Back: no midline spinal tenderness, no lesions or ecchymosis on back  Extremities: 2+ pitting LE edema b/l. linear approx 5cm skin avulsion on L hand with overlying ecchymosis, angluated approx 3 cm avulsion of skin on distal L forearm.  proximal L forearm also with abrasion (7 cm approx) with overlying ecchymosis, skin avulsion of L elbow  Neuro: Alert and oriented x3. Strength exam difficult to obtain 2/2 minimal effort but nonfocal upper or lower extremities. Lower extremities 3/5 strength  Skin: appropriate color, warm, dry except as aforementioned  -Simona Munoz PGY-1

## 2019-01-04 NOTE — ED ADULT NURSE REASSESSMENT NOTE - NS ED NURSE REASSESS COMMENT FT1
pt cleaned, repositioned. hygiene care completed. pt has clean bandages applied to left arm. IVF infusing. will reassess.

## 2019-01-04 NOTE — H&P ADULT - HISTORY OF PRESENT ILLNESS
HPI:      PAST MEDICAL & SURGICAL HISTORY:  CAD (coronary artery disease)  HTN (hypertension)  Hyperlipidemia, unspecified hyperlipidemia type  S/P CABG x 5      Review of Systems:   CONSTITUTIONAL: No fever, weight loss, or fatigue  EYES: No eye pain, visual disturbances, or discharge  ENMT:  No difficulty hearing, tinnitus, vertigo; No sinus or throat pain  NECK: No pain or stiffness  BREASTS: No pain, masses, or nipple discharge  RESPIRATORY: No cough, wheezing, chills or hemoptysis; No shortness of breath  CARDIOVASCULAR: No chest pain, palpitations, dizziness, or leg swelling  GASTROINTESTINAL: No abdominal or epigastric pain. No nausea, vomiting, or hematemesis; No diarrhea or constipation. No melena or hematochezia.  GENITOURINARY: No dysuria, frequency, hematuria, or incontinence  NEUROLOGICAL: No headaches, memory loss, loss of strength, numbness, or tremors  SKIN: No itching, burning, rashes, or lesions   LYMPH NODES: No enlarged glands  ENDOCRINE: No heat or cold intolerance; No hair loss  MUSCULOSKELETAL: No joint pain or swelling; No muscle, back, or extremity pain  PSYCHIATRIC: No depression, anxiety, mood swings, or difficulty sleeping  HEME/LYMPH: No easy bruising, or bleeding gums  ALLERY AND IMMUNOLOGIC: No hives or eczema  [ ] all other systems negative or as per HPI    Allergies    No Known Allergies    Intolerances        Social History:     FAMILY HISTORY:  No pertinent family history in first degree relatives      MEDICATIONS  (STANDING):  levETIRAcetam 750 milliGRAM(s) Oral two times a day  melatonin 3 milliGRAM(s) Oral at bedtime    MEDICATIONS  (PRN): 89M PMH CAD/CABG, HTN, HLD, CKD 4, prior hemorrhagic CVA (with focal seizure activity), mild dementia presents with fall, confusion, loss of balance. Attempted to contact his wife for collateral information, but both listed numbers not in service. History obtained from patient who is a poor historian, ED staff, and the chart. His wife was helping him put pants on this morning when he lost his balance and fell onto the carpeted ground. Reportedly no LOC, but unclear if he struck his head. Sustained numerous abrasions to forearms. Patient currently just saying he feels cold, but otherwise without complaints. Denies dizziness, chest pain, SOB. Per the chart, it seems he has been on treatment for UTI recently and has had worsening confusion over the past 3 weeks.

## 2019-01-05 ENCOUNTER — TRANSCRIPTION ENCOUNTER (OUTPATIENT)
Age: 84
End: 2019-01-05

## 2019-01-05 VITALS
SYSTOLIC BLOOD PRESSURE: 150 MMHG | RESPIRATION RATE: 18 BRPM | HEART RATE: 63 BPM | TEMPERATURE: 98 F | OXYGEN SATURATION: 94 % | DIASTOLIC BLOOD PRESSURE: 76 MMHG

## 2019-01-05 DIAGNOSIS — N17.9 ACUTE KIDNEY FAILURE, UNSPECIFIED: ICD-10-CM

## 2019-01-05 DIAGNOSIS — Z79.899 OTHER LONG TERM (CURRENT) DRUG THERAPY: ICD-10-CM

## 2019-01-05 DIAGNOSIS — R26.9 UNSPECIFIED ABNORMALITIES OF GAIT AND MOBILITY: ICD-10-CM

## 2019-01-05 DIAGNOSIS — I25.10 ATHEROSCLEROTIC HEART DISEASE OF NATIVE CORONARY ARTERY WITHOUT ANGINA PECTORIS: ICD-10-CM

## 2019-01-05 DIAGNOSIS — D64.9 ANEMIA, UNSPECIFIED: ICD-10-CM

## 2019-01-05 DIAGNOSIS — N30.00 ACUTE CYSTITIS WITHOUT HEMATURIA: ICD-10-CM

## 2019-01-05 DIAGNOSIS — G93.41 METABOLIC ENCEPHALOPATHY: ICD-10-CM

## 2019-01-05 DIAGNOSIS — I61.9 NONTRAUMATIC INTRACEREBRAL HEMORRHAGE, UNSPECIFIED: ICD-10-CM

## 2019-01-05 DIAGNOSIS — I10 ESSENTIAL (PRIMARY) HYPERTENSION: ICD-10-CM

## 2019-01-05 DIAGNOSIS — Z29.9 ENCOUNTER FOR PROPHYLACTIC MEASURES, UNSPECIFIED: ICD-10-CM

## 2019-01-05 LAB
ANION GAP SERPL CALC-SCNC: 10 MMOL/L — SIGNIFICANT CHANGE UP (ref 5–17)
BUN SERPL-MCNC: 55 MG/DL — HIGH (ref 7–23)
CALCIUM SERPL-MCNC: 8.6 MG/DL — SIGNIFICANT CHANGE UP (ref 8.4–10.5)
CHLORIDE SERPL-SCNC: 114 MMOL/L — HIGH (ref 96–108)
CO2 SERPL-SCNC: 15 MMOL/L — LOW (ref 22–31)
CREAT ?TM UR-MCNC: 44 MG/DL — SIGNIFICANT CHANGE UP
CREAT SERPL-MCNC: 2.95 MG/DL — HIGH (ref 0.5–1.3)
FERRITIN SERPL-MCNC: 607 NG/ML — HIGH (ref 30–400)
FOLATE SERPL-MCNC: 11 NG/ML — SIGNIFICANT CHANGE UP
GLUCOSE SERPL-MCNC: 90 MG/DL — SIGNIFICANT CHANGE UP (ref 70–99)
IRON SATN MFR SERPL: 21 % — SIGNIFICANT CHANGE UP (ref 16–55)
IRON SATN MFR SERPL: 40 UG/DL — LOW (ref 45–165)
MAGNESIUM SERPL-MCNC: 1.5 MG/DL — LOW (ref 1.6–2.6)
PHOSPHATE SERPL-MCNC: 3.7 MG/DL — SIGNIFICANT CHANGE UP (ref 2.5–4.5)
POTASSIUM SERPL-MCNC: 5.1 MMOL/L — SIGNIFICANT CHANGE UP (ref 3.5–5.3)
POTASSIUM SERPL-SCNC: 5.1 MMOL/L — SIGNIFICANT CHANGE UP (ref 3.5–5.3)
SODIUM SERPL-SCNC: 139 MMOL/L — SIGNIFICANT CHANGE UP (ref 135–145)
SODIUM UR-SCNC: 127 MMOL/L — SIGNIFICANT CHANGE UP
TIBC SERPL-MCNC: 189 UG/DL — LOW (ref 220–430)
TSH SERPL-MCNC: 3.46 UIU/ML — SIGNIFICANT CHANGE UP (ref 0.27–4.2)
UIBC SERPL-MCNC: 149 UG/DL — SIGNIFICANT CHANGE UP (ref 110–370)
UUN UR-MCNC: 318 MG/DL — SIGNIFICANT CHANGE UP
VIT B12 SERPL-MCNC: >2000 PG/ML — HIGH (ref 232–1245)

## 2019-01-05 PROCEDURE — 83540 ASSAY OF IRON: CPT

## 2019-01-05 PROCEDURE — 84300 ASSAY OF URINE SODIUM: CPT

## 2019-01-05 PROCEDURE — 70450 CT HEAD/BRAIN W/O DYE: CPT

## 2019-01-05 PROCEDURE — 85027 COMPLETE CBC AUTOMATED: CPT

## 2019-01-05 PROCEDURE — 80177 DRUG SCRN QUAN LEVETIRACETAM: CPT

## 2019-01-05 PROCEDURE — 82607 VITAMIN B-12: CPT

## 2019-01-05 PROCEDURE — 82570 ASSAY OF URINE CREATININE: CPT

## 2019-01-05 PROCEDURE — 73090 X-RAY EXAM OF FOREARM: CPT

## 2019-01-05 PROCEDURE — 84100 ASSAY OF PHOSPHORUS: CPT

## 2019-01-05 PROCEDURE — 82728 ASSAY OF FERRITIN: CPT

## 2019-01-05 PROCEDURE — 99239 HOSP IP/OBS DSCHRG MGMT >30: CPT

## 2019-01-05 PROCEDURE — 87186 SC STD MICRODIL/AGAR DIL: CPT

## 2019-01-05 PROCEDURE — 71250 CT THORAX DX C-: CPT

## 2019-01-05 PROCEDURE — 80053 COMPREHEN METABOLIC PANEL: CPT

## 2019-01-05 PROCEDURE — 93005 ELECTROCARDIOGRAM TRACING: CPT

## 2019-01-05 PROCEDURE — 82746 ASSAY OF FOLIC ACID SERUM: CPT

## 2019-01-05 PROCEDURE — 84540 ASSAY OF URINE/UREA-N: CPT

## 2019-01-05 PROCEDURE — 72170 X-RAY EXAM OF PELVIS: CPT

## 2019-01-05 PROCEDURE — 73130 X-RAY EXAM OF HAND: CPT

## 2019-01-05 PROCEDURE — 90471 IMMUNIZATION ADMIN: CPT

## 2019-01-05 PROCEDURE — 87086 URINE CULTURE/COLONY COUNT: CPT

## 2019-01-05 PROCEDURE — 71045 X-RAY EXAM CHEST 1 VIEW: CPT

## 2019-01-05 PROCEDURE — 83550 IRON BINDING TEST: CPT

## 2019-01-05 PROCEDURE — 81001 URINALYSIS AUTO W/SCOPE: CPT

## 2019-01-05 PROCEDURE — 80048 BASIC METABOLIC PNL TOTAL CA: CPT

## 2019-01-05 PROCEDURE — 90715 TDAP VACCINE 7 YRS/> IM: CPT

## 2019-01-05 PROCEDURE — 99285 EMERGENCY DEPT VISIT HI MDM: CPT | Mod: 25

## 2019-01-05 PROCEDURE — 96374 THER/PROPH/DIAG INJ IV PUSH: CPT

## 2019-01-05 PROCEDURE — 83735 ASSAY OF MAGNESIUM: CPT

## 2019-01-05 PROCEDURE — 84443 ASSAY THYROID STIM HORMONE: CPT

## 2019-01-05 PROCEDURE — 72125 CT NECK SPINE W/O DYE: CPT

## 2019-01-05 RX ORDER — HEPARIN SODIUM 5000 [USP'U]/ML
5000 INJECTION INTRAVENOUS; SUBCUTANEOUS EVERY 8 HOURS
Qty: 0 | Refills: 0 | Status: DISCONTINUED | OUTPATIENT
Start: 2019-01-05 | End: 2019-01-05

## 2019-01-05 RX ORDER — FUROSEMIDE 40 MG
1 TABLET ORAL
Qty: 0 | Refills: 0 | COMMUNITY

## 2019-01-05 RX ORDER — AMLODIPINE BESYLATE 2.5 MG/1
5 TABLET ORAL DAILY
Qty: 0 | Refills: 0 | Status: DISCONTINUED | OUTPATIENT
Start: 2019-01-05 | End: 2019-01-05

## 2019-01-05 RX ORDER — CEFUROXIME AXETIL 250 MG
250 TABLET ORAL EVERY 12 HOURS
Qty: 0 | Refills: 0 | Status: DISCONTINUED | OUTPATIENT
Start: 2019-01-05 | End: 2019-01-05

## 2019-01-05 RX ORDER — CEFUROXIME AXETIL 250 MG
1 TABLET ORAL
Qty: 14 | Refills: 0 | OUTPATIENT
Start: 2019-01-05 | End: 2019-01-11

## 2019-01-05 RX ADMIN — AMLODIPINE BESYLATE 5 MILLIGRAM(S): 2.5 TABLET ORAL at 06:55

## 2019-01-05 RX ADMIN — CEFTRIAXONE 100 GRAM(S): 500 INJECTION, POWDER, FOR SOLUTION INTRAMUSCULAR; INTRAVENOUS at 06:55

## 2019-01-05 RX ADMIN — LEVETIRACETAM 750 MILLIGRAM(S): 250 TABLET, FILM COATED ORAL at 06:55

## 2019-01-05 RX ADMIN — Medication 3 MILLIGRAM(S): at 00:00

## 2019-01-05 RX ADMIN — LEVETIRACETAM 750 MILLIGRAM(S): 250 TABLET, FILM COATED ORAL at 00:00

## 2019-01-05 RX ADMIN — HEPARIN SODIUM 5000 UNIT(S): 5000 INJECTION INTRAVENOUS; SUBCUTANEOUS at 06:56

## 2019-01-05 NOTE — DISCHARGE NOTE ADULT - HOSPITAL COURSE
89yoM w/ PMHx significant for CAD/CABG, HTN, HLD, CKD 4, prior hemorrhagic CVA (with focal seizure activity), mild dementia presents with fall, confusion, loss of balance, admitted with encephalopathy from UTI as well as GORGE on CKD.  ·  Problem: Metabolic encephalopathy.  Plan: --per wife's reports, has had increasing confusion and worsening sundowning over the past few weeks; she now states he appears much improved  --likely in the setting of infection, will treat with for UTI as below  --negative CT head and neck  --fall precautions.   ·  Problem: Acute cystitis without hematuria.  Plan: --CTX while inpatient, will D/C pt on PO Abx to complete 7day course; outpatient PMD appt within week to follow up urine culture.  --has history of aerococcus in the urine.   ·  Problem: Acute kidney injury superimposed on CKD.  Plan: --Cr elevated from baseline of 2.2 to about 3.2; now downtrending close to baseline  --potentially intravascularly depleted from UTI; pt may still be on lasix but med list is unclear at the moment; will continue to hold; wife explained medication side effects-- she is to f/u w/ outpatient PMD to clarify home regimen within one week of discharge.   ·  Problem: Gait abnormality.  Plan: --negative imaging for injuries from fall  --PT consult.   ·  Problem: Normocytic anemia.  Plan: --Hgb 9s, had some lacerations but no signs of significant bleeding. MCV high normal, can check B12 and folic acid. May also be related to CKD.   Problem: CAD (coronary artery disease). Plan: --hold antiplatelets as unclear if he restarted after ICH last year; as above, wife to clarify medication regimen with PMD.  ·  Problem: HTN (hypertension).  Plan: --BP elevated to 180, patient thinks he is probably still on amlodipine.   --continue Amlodipine.   ·  Problem: Hemorrhagic cerebrovascular accident (CVA).  Plan: --patient had focal seizure activity with ICH last year.   --c/w Keppra. Check level. Clarify dose in the morning.   ·  Problem: Need for prophylactic measure.  Plan: --VTE PPX HSQ.     Attending Attestation:   Per wife, who is eager to have patient be discharged home to continue ongoing management of Abx, patient's mental status has improved. She understands plan for Abx, continued hydration, and follow up with PMD within one week of discharge especially to clarify home medication regimen. All questions answered. Total discharge planning time spent = 35minutes.

## 2019-01-05 NOTE — PROGRESS NOTE ADULT - PROBLEM SELECTOR PLAN 5
--Hgb 9s, had some lacerations but no signs of significant bleeding. MCV high normal, can check B12 and folic acid. May also be related to CKD.

## 2019-01-05 NOTE — PROGRESS NOTE ADULT - ASSESSMENT
89yoM w/ PMHx significant for CAD/CABG, HTN, HLD, CKD 4, prior hemorrhagic CVA (with focal seizure activity), mild dementia presents with fall, confusion, loss of balance, admitted with encephalopathy from UTI as well as GORGE on CKD.

## 2019-01-05 NOTE — DISCHARGE NOTE ADULT - PLAN OF CARE
Symptoms improving Continue antibiotic as ordered Low salt diet  Activity as tolerated.  Take all medication as prescribed.  Follow up with your medical doctor for routine blood pressure monitoring at your next visit.  Notify your doctor if you have any of the following symptoms:   Dizziness, Lightheadedness, Blurry vision, Headache, Chest pain, Shortness of breath

## 2019-01-05 NOTE — DISCHARGE NOTE ADULT - CARE PROVIDER_API CALL
Rafita Segura), Neurology; Vascular Neurology  [address] [place] [address]  Phone: [phone]  Fax: [phone]

## 2019-01-05 NOTE — DISCHARGE NOTE ADULT - SECONDARY DIAGNOSIS.
Coronary artery disease with other form of angina pectoris Hypertension, unspecified type Metabolic encephalopathy

## 2019-01-05 NOTE — DISCHARGE NOTE ADULT - MEDICATION SUMMARY - MEDICATIONS TO TAKE
I will START or STAY ON the medications listed below when I get home from the hospital:    levETIRAcetam 750 mg oral tablet  -- 1 tab(s) by mouth 2 times a day  -- Indication: For Metabolic encephalopathy    atorvastatin 40 mg oral tablet  -- 1 tab(s) by mouth once a day  -- Indication: For CAD (coronary artery disease)    amLODIPine 10 mg oral tablet  -- 1 tab(s) by mouth once a day  -- Indication: For HTN (hypertension)    cefuroxime 250 mg oral tablet  -- 1 tab(s) by mouth every 12 hours  -- Indication: For Acute cystitis without hematuria    melatonin 3 mg oral tablet  -- 1 tab(s) by mouth once a day (at bedtime), As needed, Insomnia  -- Indication: For sleep aid     lactobacillus acidophilus oral capsule  -- 1 cap(s) by mouth once a day through 8/8/17  -- Indication: For Gerd    pantoprazole 40 mg oral delayed release tablet  -- 1 tab(s) by mouth once a day  -- Indication: For Gerd

## 2019-01-05 NOTE — PROGRESS NOTE ADULT - ATTENDING COMMENTS
Per wife, who is eager to have patient be discharged home to continue ongoing management of Abx, patient's mental status has improved. She understands plan for Abx, continued hydration, and follow up with PMD within one week of discharge especially to clarify home medication regimen. All questions answered. Total discharge planning time spent = 35minutes.

## 2019-01-05 NOTE — PROGRESS NOTE ADULT - PROBLEM SELECTOR PLAN 2
--CTX while inpatient, will D/C pt on PO Abx to complete 7day course; outpatient PMD appt within week to follow up urine culture.  --has history of aerococcus in the urine

## 2019-01-05 NOTE — PROGRESS NOTE ADULT - SUBJECTIVE AND OBJECTIVE BOX
Patient is a 89y old  Male who presents with a chief complaint of fall, confusion (2019 23:03)    SUBJECTIVE / OVERNIGHT EVENTS: Patient seen and examined. No acute overnight events, no subjective complaints. Patient's wife at the bedside states he appears much better, and now that "we know he has an infection, I want to take him home today."     OBJECTIVE:  Vital Signs Last 24 Hrs  T(F): 97.9 (2019 10:00), Max: 98.8 (2019 02:40)  HR: 77 (2019 10:00) (58 - 93)  BP: 136/57 (2019 10:00) (115/52 - 180/90)  RR: 18 (2019 10:00) (16 - 18)  SpO2: 95% (2019 10:00) (95% - 100%)    I&O's Summary  Physical Examination:  GEN: elderly man, laying in bed in NAD  PSYCH: A&Ox3, mood and affect appear appropriate   RESPI: no accessory muscle use, B/L air entry, CTAB   CARDIO: regular rate/rhythm, no LE edema B/L  ABD: soft, NT, ND, +BS  EXT: patient able to move all extremities spontaneously  VASC: peripheral pulses palpated    Labs:    139  |  114<H>  |  55<H>  ----------------------------<  90  5.1   |  15<L>  |  2.95<H>    Ca    8.6      2019 06:56  Phos  3.7       Mg     1.5         TPro  6.2  /  Alb  3.5  /  TBili  0.3  /  DBili  x   /  AST  14  /  ALT  14  /  AlkPhos  74      Urinalysis Basic - ( 2019 19:46 )  Color: Light Yellow / Appearance: Slightly Turbid / S.012 / pH: x  Gluc: x / Ketone: Negative  / Bili: Negative / Urobili: Negative   Blood: x / Protein: 300 mg/dL / Nitrite: Negative   Leuk Esterase: Moderate / RBC: 2 /hpf / WBC 58 /hpf   Sq Epi: x / Non Sq Epi: 0 /hpf / Bacteria: Many    Care Discussed with Consultants/Other Providers: NP-Valentina    MEDICATIONS  (STANDING):  amLODIPine   Tablet 5 milliGRAM(s) Oral daily  cefTRIAXone   IVPB 1 Gram(s) IV Intermittent every 24 hours  heparin  Injectable 5000 Unit(s) SubCutaneous every 8 hours  levETIRAcetam 750 milliGRAM(s) Oral two times a day  melatonin 3 milliGRAM(s) Oral at bedtime

## 2019-01-05 NOTE — DISCHARGE NOTE ADULT - PATIENT PORTAL LINK FT
You can access the [facility] Patient Portal, offered by [facility], by registering with the following website: [email or web address]

## 2019-01-05 NOTE — PROGRESS NOTE ADULT - PROBLEM SELECTOR PLAN 3
--Cr elevated from baseline of 2.2 to about 3.2; now downtrending close to baseline  --potentially intravascularly depleted from UTI; pt may still be on lasix but med list is unclear at the moment; will continue to hold; wife explained medication side effects-- she is to f/u w/ outpatient PMD to clarify home regimen within one week of discharge

## 2019-01-05 NOTE — DISCHARGE NOTE ADULT - MEDICATION SUMMARY - MEDICATIONS TO STOP TAKING
I will STOP taking the medications listed below when I get home from the hospital:    furosemide 20 mg oral tablet  -- 1 tab(s) by mouth once a day    amoxicillin 500 mg oral capsule  -- 1 cap(s) by mouth every 12 hours through 8/8/17

## 2019-01-05 NOTE — PROGRESS NOTE ADULT - PROBLEM SELECTOR PLAN 1
--per wife's reports, has had increasing confusion and worsening sundowning over the past few weeks; she now states he appears much improved  --likely in the setting of infection, will treat with for UTI as below  --negative CT head and neck  --fall precautions

## 2019-01-05 NOTE — PROGRESS NOTE ADULT - PROBLEM SELECTOR PLAN 6
--hold antiplatelets as unclear if he restarted after ICH last year; as above, wife to clarify medication regimen with PMD

## 2019-01-05 NOTE — DISCHARGE NOTE ADULT - CARE PLAN
Principal Discharge DX:	Acute cystitis without hematuria  Goal:	Symptoms improving  Assessment and plan of treatment:	Continue antibiotic as ordered  Secondary Diagnosis:	Coronary artery disease with other form of angina pectoris  Secondary Diagnosis:	Hypertension, unspecified type  Assessment and plan of treatment:	Low salt diet  Activity as tolerated.  Take all medication as prescribed.  Follow up with your medical doctor for routine blood pressure monitoring at your next visit.  Notify your doctor if you have any of the following symptoms:   Dizziness, Lightheadedness, Blurry vision, Headache, Chest pain, Shortness of breath  Secondary Diagnosis:	Metabolic encephalopathy

## 2019-01-07 LAB — LEVETIRACETAM SERPL-MCNC: 30.1 MCG/ML — SIGNIFICANT CHANGE UP (ref 12–46)

## 2019-01-08 NOTE — PROGRESS NOTE ADULT - PROBLEM SELECTOR PLAN 1
Hypokalemia  Hyperkalemia used to be a bigger problem for pt  She is seeing me later this week  Will need to assess her BP, consider stopping or cutting back on lasix based on BP  Consider using HCTZ instead   Repeat head imaging shows stable hemorrhages (MRI and CT).   BP satisfactory, c/w keeping SBP <140, regular neurochecks   Likely dispo to rehab center tomorrow   TTE pending   Neuro recs appreciated, evaluated by neurosurgery

## 2019-02-02 ENCOUNTER — TRANSCRIPTION ENCOUNTER (OUTPATIENT)
Age: 84
End: 2019-02-02

## 2019-02-08 NOTE — DISCHARGE NOTE ADULT - CLICK TO LAUNCH ORM
[de-identified] : Patient comes in today for a followup evaluation. There are several issues to discuss.\par \par Overall, he states that he is doing relatively well. He continues to tolerate the immunotherapy in the form of Keytruda. He is receiving this every 3-4 weeks. He does not have any side effects. He is scheduled to undergo followup CAT scans next week. He continues to be followed by his medical oncologist at [place].\par \par The patient has had URI type symptoms for the past 6 days. He has had a cough productive of clear/white sputum. He does have slight nasal secretions as well which are minimally discolored. He denies any wheeze, shortness of breath, or fevers. He has had slight chills.\par \par With regards to the lymphedema in his right lower extremity, it is dramatically improved. He wears compression stockings. He also wears a lymphedema pump nocturnally with very good results. He has not been exercising so he feels as if there is some component of cardiovascular deconditioning. He now comes in for this assessment
.

## 2019-04-02 PROBLEM — I61.9 NONTRAUMATIC INTRACEREBRAL HEMORRHAGE, UNSPECIFIED: Chronic | Status: ACTIVE | Noted: 2019-01-04

## 2019-04-17 ENCOUNTER — APPOINTMENT (OUTPATIENT)
Dept: UROLOGY | Facility: CLINIC | Age: 84
End: 2019-04-17
Payer: MEDICARE

## 2019-04-17 ENCOUNTER — OUTPATIENT (OUTPATIENT)
Dept: OUTPATIENT SERVICES | Facility: HOSPITAL | Age: 84
LOS: 1 days | End: 2019-04-17
Payer: MEDICARE

## 2019-04-17 VITALS
HEART RATE: 50 BPM | WEIGHT: 157 LBS | BODY MASS INDEX: 24.64 KG/M2 | SYSTOLIC BLOOD PRESSURE: 165 MMHG | DIASTOLIC BLOOD PRESSURE: 73 MMHG | RESPIRATION RATE: 17 BRPM | HEIGHT: 67 IN | TEMPERATURE: 97.6 F

## 2019-04-17 DIAGNOSIS — R32 UNSPECIFIED URINARY INCONTINENCE: ICD-10-CM

## 2019-04-17 DIAGNOSIS — Z95.1 PRESENCE OF AORTOCORONARY BYPASS GRAFT: Chronic | ICD-10-CM

## 2019-04-17 PROCEDURE — 99203 OFFICE O/P NEW LOW 30 MIN: CPT | Mod: 25

## 2019-04-17 PROCEDURE — 76775 US EXAM ABDO BACK WALL LIM: CPT | Mod: 26

## 2019-04-17 PROCEDURE — 76775 US EXAM ABDO BACK WALL LIM: CPT

## 2019-04-17 NOTE — ADDENDUM
[FreeTextEntry1] :  I, Ai Bueno, acted solely as a scribe for Dr. Adolfo Cobb. The documentation recorded by the scribe accurately reflects the service I personally performed and the decision by me.\par

## 2019-04-17 NOTE — PHYSICAL EXAM
[Rectal Exam - Rectum] : digital rectal exam was normal [Prostate Size ___ gm] : prostate size [unfilled] gm [FreeTextEntry1] : small gland

## 2019-04-17 NOTE — ASSESSMENT
[FreeTextEntry1] : Pt leaks at night and he wears paper pads to sleep.\par US today reviewed showed b/l renal cysts, no hydronephrosis, no stones.\par UA and UC ordered.\par RTO if he becomes asymptomatic

## 2019-04-19 LAB
APPEARANCE: ABNORMAL
BACTERIA: ABNORMAL
BILIRUBIN URINE: NEGATIVE
BLOOD URINE: NORMAL
COLOR: YELLOW
GLUCOSE QUALITATIVE U: NEGATIVE
HYALINE CASTS: 6 /LPF
KETONES URINE: NEGATIVE
LEUKOCYTE ESTERASE URINE: ABNORMAL
MICROSCOPIC-UA: NORMAL
NITRITE URINE: NEGATIVE
PH URINE: 6
PROTEIN URINE: ABNORMAL
RED BLOOD CELLS URINE: 2 /HPF
SPECIFIC GRAVITY URINE: 1.01
SQUAMOUS EPITHELIAL CELLS: 0 /HPF
UROBILINOGEN URINE: NORMAL
WHITE BLOOD CELLS URINE: 327 /HPF

## 2019-04-25 DIAGNOSIS — R32 UNSPECIFIED URINARY INCONTINENCE: ICD-10-CM

## 2019-04-26 DIAGNOSIS — Z87.440 PERSONAL HISTORY OF URINARY (TRACT) INFECTIONS: ICD-10-CM

## 2019-04-26 LAB — BACTERIA UR CULT: ABNORMAL

## 2019-04-26 RX ORDER — NITROFURANTOIN MACROCRYSTALS 50 MG/1
50 CAPSULE ORAL
Qty: 30 | Refills: 0 | Status: ACTIVE | COMMUNITY
Start: 2019-04-26 | End: 1900-01-01

## 2019-07-24 NOTE — DISCHARGE NOTE ADULT - CAREGIVER RELATION TO PATIENT
Hypokalemia  Hypokalemia means a low level of potassium in the blood. This most often occurs in people who take water pills (diuretics). It can also occur because of severe vomiting or diarrhea. You may also have it if you take laxatives for long periods of time. It sometimes happens if you have low magnesium (hypomagnesemia). If you have this, your healthcare provider will treat the low magnesium first.  A mild case of hypokalemia usually causes no symptoms. It is only found with blood testing. More severe potassium loss causes overall weakness, muscle or abdominal cramps, rapid or irregular heartbeats (heart palpitations), low blood pressure, and muscle weakness.   Home care  · Take any potassium supplements as prescribed.  · Eat foods rich in potassium. The highest amount is found in avocado, baked potatoes, spinach, cantaloupe, cod, halibut, salmon, and scallops. White, red, or chaves beans are also very good sources. A modest amount of potassium is found in orange juice, bananas, carrots, and tomato juice.  · If you take certain types of diuretics, you will also need to take potassium supplements. If you take a diuretic, discuss potassium supplements with your doctor.  Follow-up care  Follow up with your healthcare provider for a repeat blood test within the next week, or as advised by our staff.  When to seek medical advice  Call your healthcare provider right away if any of the following occur:  · Increased weakness, fatigue, or muscle cramps  · Dizziness  Call 911  Call 911 if any of the following occur:  · Irregular heartbeat, extra beats, or very fast heart rate  · Loss of consciousness  Date Last Reviewed: 7/1/2017 © 2000-2018 [employer]. [address] [place] [id number]. All rights reserved. This information is not intended as a substitute for professional medical care. Always follow your healthcare professional's instructions.         spouse

## 2019-08-05 NOTE — ED PROVIDER NOTE - NSCAREINITIATED _GEN_ER
Simona Munoz(Resident) Consent (Marginal Mandibular)/Introductory Paragraph: The rationale for Mohs was explained to the patient and consent was obtained. The risks, benefits and alternatives to therapy were discussed in detail. Specifically, the risks of damage to the marginal mandibular branch of the facial nerve, infection, scarring, bleeding, prolonged wound healing, incomplete removal, allergy to anesthesia, and recurrence were addressed. Prior to the procedure, the treatment site was clearly identified and confirmed by the patient. All components of Universal Protocol/PAUSE Rule completed.

## 2019-08-20 NOTE — DISCHARGE NOTE ADULT - NSTOBACCONEVERSMOKERY/N_GEN_A
Subjective:      Raul Olivares is a 70 y.o. male who presents with abd pain        HPI     Patient with history of dyspepsia still with cramping pain and bloating not always related to meals, unrelated to etoh or caffeine, eating larger meals not affect symtoms, but fish and chips did make pain worse 1 to 2 hours after meal, will also notice symptoms of belching worse at night. Seen by digestive health consultants July 31, EGD performed showing grade B esophagitis negative for metaplasia, chronic inflammation, also had colonoscopy at that time tubular adenoma repeat 5 years per GI.  No melena or hematochezia.  Patient anemia on labs 6/27/19 hemoglobin 12.9, hematocrit 38, MCV 34, platelet 259 with negative colonoscopy recently by GI, EGD showing mild chronic gastritis, still on protonix 40 mg per day from GI. Will be having EUS done by [facility]   Takes excedrin 6 per day, no other NSAIDs  Decreased GFR 6/25/19 bun 22,creat 1.39,GFR 50,PTH 12.9,calcium 9.6, this has been chronic   Medication, allergies, medical history, surgical history, social history, family history reviewed and updated    Current Outpatient Medications   Medication Sig Dispense Refill   • amlodipine-benazepril (LOTREL) 10-40 MG per capsule Take 1 Cap by mouth every day. 90 Cap 2   • atorvastatin (LIPITOR) 40 MG Tab Take 1 Tab by mouth every day. 90 Tab 2   • pantoprazole (PROTONIX) 40 MG Tablet Delayed Response Take 1 Tab by mouth every day. 90 Tab 2   • ipratropium (ATROVENT) 0.03 % Solution Spray 2 Sprays in nose 2 times a day. 1 Bottle 6   • Glucosamine-Chondroit-Vit C-Mn (GLUCOSAMINE 1500 COMPLEX) Cap Take  by mouth.     • Cyanocobalamin (B-12) 1000 MCG CAPS Take  by mouth.       • docosahexanoic acid (OMEGA 3 FA) 1000 MG CAPS Take 1,000 mg by mouth every day.       • Calcium Carbonate-Vitamin D (CALCIUM + D PO) Take  by mouth.       • Cholecalciferol (VITAMIN D3) 2000 UNITS TABS Take  by mouth.         No current facility-administered  medications for this visit.            Decreased GFR  11/20/13 bun 17,creat 1.3,GFR 58  4/14/15 bun 18,creat 1.32,GFR 54  4/30/17 bun 27,creat 1.22,GFR 59  11/26/18 bun 20,creat 1.3,GFR 57 done in [place]   6/25/19 bun 22,creat 1.39,GFR 50,PTH 12.9,calcium 9.6     Dupuytren's contracture  5/09  ortho left hand   4/08  ortho right hand     Dyslipidemia  11/22/13 chol 218,trig 115,hdl 43,ldl 152  4/29/14 start pravachol 20 mg  4/14/15 chol 223,trig 128,hdl 48,ldl 149 on pravachol 20 mg  5/12/15 chol 150,trig 92,hdl 48,ldl 84 on lipitor 40 mg  4/28/17 chol 157,trig 68,hdl 51,ldl 92 on lipitor 40 mg  11/26/18 chol 130,trig 78,hdl 51,ldl 83 on lipitor 40 mg done in [place]   6/25/19 chol 130,trig 61,hdl 45,ldl 73 on lipitor 40 mg     Gastroesophageal reflux  6/3/13 EGD per [facility] negative inflammation consistent with reflux, no mike's  11/13 on protonix per [facility]   4/28/17 vit b12 1218, mag 2.2,vit d 33  7/2/18 work on protonix taper  11/26/18  b12 962,folate 23 done in [facility]  5/28/19 dyspepsia intermittent lower abdominal cramping once per week, no diarrhea or stool changes, question IBS, labs ordered, trial of probiotics x2 weeks and discontinue protonix after that see if can taper, consider GI evaluation if no improvement  6/25/19 b12 768   7/31/19 EGD per [facility] LA grade B esophagitis pathology negative celiac disease, squamocolumnar junction mucosa mild chronic inflammation negative for intestinal metaplasia medium size hiatal hernia     Hypertension  2/21/13 start lisinopril 10 mg  11/8/13 off medication; start lotrel 5/20 mg  11/22/13 urine mac 8.7 on lotrel 5/20 mg  4/29/14 increase lotrel to 10/40 mg   6/25/19 bun 22,creat 1.39,GFR 50,PTH 12.9,calcium 9.6 on lotrel 10/40 mg      Nasal septal deviation  Has seen ENT in the past  7/2/18 on alarest, trial of atrovent nasal     Preventative  2/21/13 tdap  4/16/15 prevnar  6/20/16 pneumovax  7/2/18 shingrix provided to get at  pharmacy  11/26/18 psa 2.2 done in [place], [place]  11/26/18 vit d 46 done in [place], [place]  7/31/19 colon per [facility] 3 mm tubular adenoma ascending colon, internal hemorrhoids, repeat 5 years     Rotator cuff syndrome  6/08 MRI left shoulder full thickness tear supraspinatous with retraction, high-grade tendinopathy  6/22/18 [place] [facility] note order MRI   7/2/18 MRI left shoulder complete full-thickness tear of the supraspinatus tendon with retraction of the tendon to the level of the bony glenoid and severe muscle atrophy full-thickness tear of the majority of the fibers of the infraspinatus tendon with retraction of fibers to the level of the bony glenoid. There is moderate muscle atrophy partial-thickness tear of the articular surface fibers of the subscapularis tendon tear of the superior glenoid labrum and biceps anchor with extension into the posterior/superior labrum consistent with SLAP tear, nonvisualization of the long head of the biceps tendon within and above the bicipital groove consistent with tear versus severe thinning, chronic tear of the anterior/inferior, inferior and posterior/inferior glenoid labrum               Patient Active Problem List   Diagnosis   • Dupuytren's contracture   • Rotator cuff syndrome   • Family history of colon cancer   • Preventative health care   • Nasal septal deviation   • History of local excision of skin lesion   • Hypertension   • GERD (gastroesophageal reflux disease)   • Dyslipidemia   • History of obesity   • Decreased GFR           Health Maintenance Summary                HEPATITIS C SCREENING Overdue 1949     IMM HEP B VACCINE Overdue 4/22/1968     IMM ZOSTER VACCINES Overdue 4/22/1999     IMM INFLUENZA Next Due 9/1/2019      Done 10/26/2018 Imm Admin: Influenza Vaccine Adult HD     Patient has more history with this topic...    Annual Wellness Visit Next Due 5/28/2020      Done 5/28/2019 Visit Dx: Medicare annual wellness visit,  subsequent     Patient has more history with this topic...    IMM DTaP/Tdap/Td Vaccine Next Due 2/21/2023      Done 2/21/2013 Imm Admin: Tdap Vaccine    COLONOSCOPY Next Due 7/31/2029      Done 7/31/2019 REFERRAL TO GI FOR COLONOSCOPY     Patient has more history with this topic...          Patient Care Team:  Mihir Thompson M.D. as PCP - General (Internal Medicine)      ROS       Objective:          Physical Exam   Constitutional: He appears well-developed and well-nourished. No distress.   HENT:   Head: Normocephalic and atraumatic.   Right Ear: External ear normal.   Left Ear: External ear normal.   Eyes: Conjunctivae are normal. Right eye exhibits no discharge. Left eye exhibits no discharge.   Neck: Neck supple. No JVD present. No thyromegaly present.   Cardiovascular: Normal rate, regular rhythm and normal heart sounds.   Pulmonary/Chest: Effort normal and breath sounds normal.   Abdominal: He exhibits no distension.   Musculoskeletal: He exhibits no edema.   Neurological: He is alert.   Skin: Skin is warm. He is not diaphoretic.   Psychiatric: He has a normal mood and affect. His behavior is normal.   Nursing note and vitals reviewed.    Abdomen prominent aorta pulsation no bruit          Assessment/Plan:     Assessment  #1 dyspepsia still persistent despite recent EGD 7/31/19 EGD per [facility] LA grade B esophagitis pathology negative celiac disease, squamocolumnar junction mucosa mild chronic inflammation negative for intestinal metaplasia medium size hiatal hernia, question whether Excedrin 6 but is contributing to symptoms although he states this makes pain better     #2 hypertension stable blood pressure 6/25/19 bun 22,creat 1.39,GFR 50,PTH 12.9,calcium 9.6 on lotrel 10/40 mg     #3 anemia on most recent labs 6/27/19 hemoglobin 12.9, hematocrit 38, MCV 34, platelet 259 with negative colonoscopy recently by GI, EGD showing mild chronic gastritis    #4 decreased GFR 6/25/19 bun 22,creat 1.39,GFR 50,PTH  12.9,calcium 9.6       Plan  #1 repeat labs were ordered 6/29/194 CKD and anemia, patient will have that done    #2 avoid NSAIDs and stop excedrin taking 6 per day    #3 try tylenol instead for pain up to 2500 mg daily     #4 patient will be having EUS done by [facility]      #5 renal ultrasound evaluate CKD and aorta ultrasound prominent aortic pulsation evaluate aneurysm    #6 check blood pressures and record    #7 follow-up 3 months             No

## 2019-09-23 NOTE — ED PROVIDER NOTE - MEDICAL DECISION MAKING DETAILS
Patient returning phone call. Simona Munoz MD PGY-1 Pt is a 89 yo man with PMH CVA (on ASA), CAD, HTN, HLD, hx sundowning who presents after fall, progressive confusion over last few days, unclear if head injury at time of accident per wife, multiple skin avulsions, likely steristrips after irrigation given fragile nature of skin, tdap, xrays, head CT (evaluate for intracranial pathology leading to or as a reuslt of fall), labs, UA, chest XRay to evaluate for occult infection given progressive confusion.

## 2020-12-21 PROBLEM — Z87.440 HISTORY OF URINARY TRACT INFECTION: Status: RESOLVED | Noted: 2019-04-26 | Resolved: 2020-12-21

## 2022-12-02 NOTE — PATIENT PROFILE ADULT - NSTRANSFERBELONGINGSDISPO_GEN_A_NUR
not applicable Detail Level: Detailed Quality 110: Preventive Care And Screening: Influenza Immunization: Influenza Immunization previously received during influenza season Quality 226: Preventive Care And Screening: Tobacco Use: Screening And Cessation Intervention: Patient screened for tobacco use and is an ex/non-smoker

## 2024-02-21 NOTE — ED PROCEDURE NOTE - CPROC ED LACER REPAIR DETAIL1
Medication: ibuprofen (MOTRIN) 800 MG tablet  passed protocol.    Last office visit date: 11/15/2023  Next appointment scheduled?: Yes   Number of refills given: 0 No foreign body/Multiple flaps were aligned./Non-extensive debridement was performed./The wound was explored to base in bloodless field.

## 2025-04-15 NOTE — DIETITIAN INITIAL EVALUATION ADULT. - DOB: +DATEOFBIRTH
Your nurse today was: ROWAN Ventura    Your provider was: Wilfredo Alvarado     Rash on face:  -begin Hydrocortisone 2.5% ointment, Apply to affected areas of rash on face twice daily for up to 2 weeks as needed for redness and itching with moisturizer on top.     Keratosis Pilaris  Keratosis pilaris is a common skin condition, which appears as tiny bumps on the skin. These rough-feeling bumps are actually plugs of dead skin cells.  If the itch, dryness, or the appearance of keratosis pilaris bothers you, treatment can help.  Dry skin can make these bumps more noticeable. In fact, many people say the bumps clear during the summer only to return in the winter. If you live in a dry climate or frequently swim in a pool, you may see these bumps year round.    Signs and Symptoms  This common skin condition causes tiny bumps that can:   Feel rough and dry like sandpaper  Become more noticeable in winter or a dry climate  Itch  Appear in different colors, including the same color as your skin, white, red, pinkish purple (on fair skin), and brownish black (on dark skin)  Some people have a few flesh-colored bumps. Others have noticeable bumps that look like pimples or a rash.    Where keratosis pilaris appears  These bumps can appear anywhere on your skin, except on your palms and soles. Bumps on the arms are common. Most people will see these bumps appear in the following areas:    Children: Upper arms, thighs (front), and cheeks    Teens and adults: Upper arms, thighs, and buttocks  Some people develop so many bumps on their skin that the bumps extend to their lower legs and forearms.    Treatment  This skin condition is harmless, so you don’t need to treat it.   If the itch, dryness, or the appearance of your skin bothers you, treatment can help.    Relieve the itch and dryness: A creamy moisturizer can soothe the itch and dryness. Most moisturizing creams used to treat keratosis pilaris contain one of the following  ingredients:    Cerave SA     For best results, apply your moisturizer:  After every shower or bath  Within 5 minutes of getting out of the bath or shower, while your skin is still damp   At least 2 or 3 times a day, gently massaging it into the skin with keratosis pilaris      Statement Selected